# Patient Record
Sex: FEMALE | Race: WHITE | Employment: OTHER | URBAN - METROPOLITAN AREA
[De-identification: names, ages, dates, MRNs, and addresses within clinical notes are randomized per-mention and may not be internally consistent; named-entity substitution may affect disease eponyms.]

---

## 2017-04-26 ENCOUNTER — OFFICE VISIT (OUTPATIENT)
Dept: DERMATOLOGY CLINIC | Facility: CLINIC | Age: 77
End: 2017-04-26

## 2017-04-26 DIAGNOSIS — L82.0 INFLAMED SEBORRHEIC KERATOSIS: ICD-10-CM

## 2017-04-26 DIAGNOSIS — L82.1 SEBORRHEIC KERATOSES: ICD-10-CM

## 2017-04-26 DIAGNOSIS — D23.30 BENIGN NEOPLASM OF SKIN OF FACE: ICD-10-CM

## 2017-04-26 DIAGNOSIS — D22.9 MULTIPLE NEVI: ICD-10-CM

## 2017-04-26 DIAGNOSIS — D23.60 BENIGN NEOPLASM OF SKIN OF UPPER LIMB, INCLUDING SHOULDER, UNSPECIFIED LATERALITY: ICD-10-CM

## 2017-04-26 DIAGNOSIS — D23.5 BENIGN NEOPLASM OF SKIN OF TRUNK, EXCEPT SCROTUM: ICD-10-CM

## 2017-04-26 DIAGNOSIS — D23.4 BENIGN NEOPLASM OF SCALP AND SKIN OF NECK: ICD-10-CM

## 2017-04-26 DIAGNOSIS — L57.0 AK (ACTINIC KERATOSIS): Primary | ICD-10-CM

## 2017-04-26 PROCEDURE — 99213 OFFICE O/P EST LOW 20 MIN: CPT | Performed by: DERMATOLOGY

## 2017-04-26 PROCEDURE — 17000 DESTRUCT PREMALG LESION: CPT | Performed by: DERMATOLOGY

## 2017-05-15 NOTE — PROGRESS NOTES
Deann Kincaid is a 78year old female. HPI:     CC:  Patient presents with:  Skin Cancer: Pt here for full body check. Pt thinks she has lesion at back that [address] is watching (non-bothersome). Pls check growth at R scalp, scaly area at L temple.   Hx BCC NextGen:  \"two miscarriages. \"   • Corneal arcus senilis 2009     Corneal Arcus, OU   • Blepharitis of both eyes 2009   • Vitreous floaters of left eye 7/11/2014         Past Surgical History    HYSTERECTOMY         Social History   Marital Status: Natan Reyes neck, face,nails, hair, external eyes, including conjunctival mucosa, eyelids, lips external ears, back, chest,/ breasts, axillae,  abdomen, arms, legs, palms.      Multiple light to medium brown, well marginated, uniformly pigmented, macules and papules 6 See  Medications in grid. Instructions reviewed at length. Benign nevi, seborrheic  keratoses, cherry angiomas:  Reassurance regarding other benign skin lesions. Signs and symptoms of skin cancer, ABCDE's of melanoma discussed with patient.  Sunscreen us

## 2017-05-17 ENCOUNTER — HOSPITAL ENCOUNTER (OUTPATIENT)
Dept: MAMMOGRAPHY | Facility: HOSPITAL | Age: 77
Discharge: HOME | End: 2017-05-17
Attending: OBSTETRICS & GYNECOLOGY
Payer: COMMERCIAL

## 2017-05-17 DIAGNOSIS — Z12.31 VISIT FOR SCREENING MAMMOGRAM: ICD-10-CM

## 2017-05-17 PROCEDURE — 77067 SCR MAMMO BI INCL CAD: CPT | Performed by: OBSTETRICS & GYNECOLOGY

## 2017-08-28 ENCOUNTER — OFFICE VISIT (OUTPATIENT)
Dept: OPHTHALMOLOGY | Facility: CLINIC | Age: 77
End: 2017-08-28

## 2017-08-28 DIAGNOSIS — H52.203 HYPEROPIA OF BOTH EYES WITH ASTIGMATISM AND PRESBYOPIA: ICD-10-CM

## 2017-08-28 DIAGNOSIS — H52.4 HYPEROPIA OF BOTH EYES WITH ASTIGMATISM AND PRESBYOPIA: ICD-10-CM

## 2017-08-28 DIAGNOSIS — H52.03 HYPEROPIA OF BOTH EYES WITH ASTIGMATISM AND PRESBYOPIA: ICD-10-CM

## 2017-08-28 DIAGNOSIS — H25.12 NUCLEAR SCLEROTIC CATARACT OF LEFT EYE: Primary | ICD-10-CM

## 2017-08-28 PROCEDURE — 92015 DETERMINE REFRACTIVE STATE: CPT | Performed by: OPHTHALMOLOGY

## 2017-08-28 PROCEDURE — 92014 COMPRE OPH EXAM EST PT 1/>: CPT | Performed by: OPHTHALMOLOGY

## 2017-08-28 NOTE — PROGRESS NOTES
Mike Sylvester is a 78year old female. HPI:     HPI     EP/ 78 yr old here for a complete exam. LDE 11/20/15 with hx of hyperopia, astigmatism and floaters.  Pt complains of blurred vision OU at distance and near with her glasses and would like an new Pupils       Pupils    Right PERRL    Left PERRL          Visual Fields       Left Right     Full Full          Extraocular Movement       Right Left     Full, Ortho Full, Ortho          Dilation     Both eyes:  1.0% Mydriacyl and 2.5% Jai Synephrine @ 11:

## 2017-08-28 NOTE — PATIENT INSTRUCTIONS
Nuclear sclerotic cataract of left eye  Mild; no treatment. New glasses Rx given. Suggest update. Hyperopia with astigmatism and presbyopia  New glasses Rx given.

## 2017-10-19 ENCOUNTER — OFFICE VISIT (OUTPATIENT)
Dept: OPTOMETRY | Facility: CLINIC | Age: 77
End: 2017-10-19

## 2017-10-19 DIAGNOSIS — H52.4 HYPEROPIA OF BOTH EYES WITH ASTIGMATISM AND PRESBYOPIA: Primary | ICD-10-CM

## 2017-10-19 DIAGNOSIS — H52.03 HYPEROPIA OF BOTH EYES WITH ASTIGMATISM AND PRESBYOPIA: Primary | ICD-10-CM

## 2017-10-19 DIAGNOSIS — H52.203 HYPEROPIA OF BOTH EYES WITH ASTIGMATISM AND PRESBYOPIA: Primary | ICD-10-CM

## 2017-10-19 NOTE — PATIENT INSTRUCTIONS
Hyperopia with astigmatism and presbyopia  Patient will take the RX back to [facility] and have remade. I advised patient that the new RX change is minimal and that she should get the same brand of progressive she had at [facility].     NO CHARGE AUGUSTINA

## 2017-10-19 NOTE — PROGRESS NOTES
Mary Bess is a 78year old female. HPI:     HPI     Patient had glasses made from [employer]'s rx from 8-28-17 and she is not happy with the vision in her progressives or  her readers. Readers do not seem to be strong enough.  If she turns her head off natalie Visual Acuity (Snellen - Linear)       Right Left    Dist cc 20/25 20/25    Near cc 6pt 6pt            Refraction     Wearing Rx       Sphere Cylinder Axis Add    Right +2.00 +0.75 180 +2.75    Left +2.50 +0.75 180 +2.75          Manifest Refraction     Alton Rosado

## 2017-10-19 NOTE — ASSESSMENT & PLAN NOTE
Patient will take the RX back to [facility] and have remade. I advised patient that the new RX change is minimal and that she should get the same brand of progressive she had at [facility].     NO CHARGE GLASSES RECHECK FOR CC

## 2017-11-29 ENCOUNTER — OFFICE VISIT (OUTPATIENT)
Dept: DERMATOLOGY CLINIC | Facility: CLINIC | Age: 77
End: 2017-11-29

## 2017-11-29 VITALS — BODY MASS INDEX: 20.29 KG/M2 | HEIGHT: 59.5 IN | WEIGHT: 102 LBS

## 2017-11-29 DIAGNOSIS — Z80.8 FAMILY HISTORY OF MELANOMA: ICD-10-CM

## 2017-11-29 DIAGNOSIS — D22.9 MULTIPLE NEVI: ICD-10-CM

## 2017-11-29 DIAGNOSIS — D23.60 BENIGN NEOPLASM OF SKIN OF UPPER LIMB, INCLUDING SHOULDER, UNSPECIFIED LATERALITY: ICD-10-CM

## 2017-11-29 DIAGNOSIS — L82.0 INFLAMED SEBORRHEIC KERATOSIS: ICD-10-CM

## 2017-11-29 DIAGNOSIS — D23.4 BENIGN NEOPLASM OF SCALP AND SKIN OF NECK: ICD-10-CM

## 2017-11-29 DIAGNOSIS — L57.0 AK (ACTINIC KERATOSIS): Primary | ICD-10-CM

## 2017-11-29 DIAGNOSIS — D23.30 BENIGN NEOPLASM OF SKIN OF FACE: ICD-10-CM

## 2017-11-29 DIAGNOSIS — D23.5 BENIGN NEOPLASM OF SKIN OF TRUNK, EXCEPT SCROTUM: ICD-10-CM

## 2017-11-29 PROCEDURE — 99213 OFFICE O/P EST LOW 20 MIN: CPT | Performed by: DERMATOLOGY

## 2017-11-29 PROCEDURE — G0463 HOSPITAL OUTPT CLINIC VISIT: HCPCS | Performed by: DERMATOLOGY

## 2017-12-10 NOTE — PROGRESS NOTES
Mike Sylvester is a 78year old female.   HPI:     CC:  Patient presents with:  Full Skin Exam: areas of concern on face ,head ,possible cyst on scalp ,red lesion on left thigh ,spot on back that is itchy ,personal HX of BCC and family HX of BCC and Melano Surgical History:  No date: HYSTERECTOMY      Comment: age 41    Social History  Social History   Marital status:   Spouse name: N/A    Years of education: N/A  Number of children: N/A     Occupational History  None on file     Social History Main T light to medium brown, well marginated, uniformly pigmented, macules and papules 6 mm and less scattered on exam. pigmented lesions examined with dermoscopy benign-appearing patterns.      Waxy tannish keratotic papules scattered, cherry-red vascular papule therapies, risks benefits discussed. Pathophysiology discussed with patient. Therapeutic options reviewed. See  Medications in grid. Instructions reviewed at length.     Benign nevi, seborrheic  keratoses, cherry angiomas:  Reassurance regarding other cheryl

## 2018-08-13 ENCOUNTER — HOSPITAL ENCOUNTER (OUTPATIENT)
Dept: MAMMOGRAPHY | Facility: HOSPITAL | Age: 78
Discharge: HOME | End: 2018-08-13
Attending: OBSTETRICS & GYNECOLOGY
Payer: COMMERCIAL

## 2018-08-13 ENCOUNTER — HOSPITAL ENCOUNTER (OUTPATIENT)
Dept: BONE DENSITY | Facility: HOSPITAL | Age: 78
Discharge: HOME | End: 2018-08-13
Attending: OBSTETRICS & GYNECOLOGY
Payer: COMMERCIAL

## 2018-08-13 DIAGNOSIS — Z78.0 POSTMENOPAUSAL STATUS: ICD-10-CM

## 2018-08-13 DIAGNOSIS — Z12.31 VISIT FOR SCREENING MAMMOGRAM: ICD-10-CM

## 2018-08-13 PROCEDURE — 77067 SCR MAMMO BI INCL CAD: CPT | Performed by: OBSTETRICS & GYNECOLOGY

## 2018-08-13 PROCEDURE — 77080 DXA BONE DENSITY AXIAL: CPT | Performed by: OBSTETRICS & GYNECOLOGY

## 2018-08-20 ENCOUNTER — OFFICE VISIT (OUTPATIENT)
Dept: DERMATOLOGY CLINIC | Facility: CLINIC | Age: 78
End: 2018-08-20
Payer: MEDICARE

## 2018-08-20 ENCOUNTER — TELEPHONE (OUTPATIENT)
Dept: DERMATOLOGY CLINIC | Facility: CLINIC | Age: 78
End: 2018-08-20

## 2018-08-20 DIAGNOSIS — D23.4 BENIGN NEOPLASM OF SCALP AND SKIN OF NECK: ICD-10-CM

## 2018-08-20 DIAGNOSIS — D23.60 BENIGN NEOPLASM OF SKIN OF UPPER LIMB, INCLUDING SHOULDER, UNSPECIFIED LATERALITY: ICD-10-CM

## 2018-08-20 DIAGNOSIS — D22.9 MULTIPLE NEVI: ICD-10-CM

## 2018-08-20 DIAGNOSIS — L82.0 INFLAMED SEBORRHEIC KERATOSIS: ICD-10-CM

## 2018-08-20 DIAGNOSIS — L82.1 SEBORRHEIC KERATOSES: ICD-10-CM

## 2018-08-20 DIAGNOSIS — Z80.8 FAMILY HISTORY OF MELANOMA: ICD-10-CM

## 2018-08-20 DIAGNOSIS — L25.5 RHUS DERMATITIS: Primary | ICD-10-CM

## 2018-08-20 DIAGNOSIS — D23.30 BENIGN NEOPLASM OF SKIN OF FACE: ICD-10-CM

## 2018-08-20 DIAGNOSIS — L57.0 AK (ACTINIC KERATOSIS): ICD-10-CM

## 2018-08-20 DIAGNOSIS — D23.5 BENIGN NEOPLASM OF SKIN OF TRUNK, EXCEPT SCROTUM: ICD-10-CM

## 2018-08-20 PROCEDURE — 99214 OFFICE O/P EST MOD 30 MIN: CPT | Performed by: DERMATOLOGY

## 2018-08-20 PROCEDURE — G0463 HOSPITAL OUTPT CLINIC VISIT: HCPCS | Performed by: DERMATOLOGY

## 2018-08-20 RX ORDER — PREDNISONE 10 MG/1
TABLET ORAL
Qty: 30 TABLET | Refills: 0 | Status: SHIPPED | OUTPATIENT
Start: 2018-08-20 | End: 2018-09-01

## 2018-09-03 NOTE — PROGRESS NOTES
Franklin More is a 70year old female. HPI:     CC:  Patient presents with:  Derm Problem: LOV 11/29/2017. Possible poison ivy rash to bialteral arms, neck, trunk, and buttocks x 1 week. Applying calamine lotion. No help. Added on to schedule today.   Ot 2009 and 7/11/2014    OU   • Miscarriage     Per NextGen:  \"two miscarriages. \"   • Subjective visual disturbance of both eyes 7/11/2014   • Vitreous floaters of left eye 7/11/2014     Past Surgical History:  No date: HYSTERECTOMY      Comment: age 41 complaints. No new or changeing lesions other than noted above. No fevers, chills, night sweats, unusual sun sensitivity. No other skin complaints. A12 point review of systems was completed. Pertinent positives and negatives noted in the the HPI. keratoses    See details on map. Extensive erythematous papular vesicular linear patches over the trunk arms erythematous patches over the arms neck buttocks thighs. Resolving bite-like lesion at left lateral thigh. Dermatitis.   Due to poison ivy/ r melanoma discussed with patient. Sunscreen use, sun protection, self exams reviewed. Followup as noted RTC routine checkup 6 mos - one year or p.r.n.     RTC 6 months or as needed

## 2019-01-16 ENCOUNTER — OFFICE VISIT (OUTPATIENT)
Dept: DERMATOLOGY CLINIC | Facility: CLINIC | Age: 79
End: 2019-01-16
Payer: COMMERCIAL

## 2019-01-16 VITALS — BODY MASS INDEX: 20.16 KG/M2 | WEIGHT: 100 LBS | HEIGHT: 59 IN

## 2019-01-16 DIAGNOSIS — D22.9 MULTIPLE NEVI: ICD-10-CM

## 2019-01-16 DIAGNOSIS — L82.1 SEBORRHEIC KERATOSES: ICD-10-CM

## 2019-01-16 DIAGNOSIS — D23.5 BENIGN NEOPLASM OF SKIN OF TRUNK, EXCEPT SCROTUM: ICD-10-CM

## 2019-01-16 DIAGNOSIS — Z80.8 FAMILY HISTORY OF MELANOMA: ICD-10-CM

## 2019-01-16 DIAGNOSIS — D23.60 BENIGN NEOPLASM OF SKIN OF UPPER LIMB, INCLUDING SHOULDER, UNSPECIFIED LATERALITY: ICD-10-CM

## 2019-01-16 DIAGNOSIS — D23.4 BENIGN NEOPLASM OF SCALP AND SKIN OF NECK: ICD-10-CM

## 2019-01-16 DIAGNOSIS — D23.30 BENIGN NEOPLASM OF SKIN OF FACE: ICD-10-CM

## 2019-01-16 DIAGNOSIS — L57.0 AK (ACTINIC KERATOSIS): ICD-10-CM

## 2019-01-16 DIAGNOSIS — D48.5 NEOPLASM OF UNCERTAIN BEHAVIOR OF SKIN: Primary | ICD-10-CM

## 2019-01-16 DIAGNOSIS — L82.0 INFLAMED SEBORRHEIC KERATOSIS: ICD-10-CM

## 2019-01-16 PROCEDURE — 99213 OFFICE O/P EST LOW 20 MIN: CPT | Performed by: DERMATOLOGY

## 2019-01-16 PROCEDURE — 88305 TISSUE EXAM BY PATHOLOGIST: CPT | Performed by: DERMATOLOGY

## 2019-01-16 PROCEDURE — 11102 TANGNTL BX SKIN SINGLE LES: CPT | Performed by: DERMATOLOGY

## 2019-01-16 PROCEDURE — 11103 TANGNTL BX SKIN EA SEP/ADDL: CPT | Performed by: DERMATOLOGY

## 2019-01-16 RX ORDER — ASCORBIC ACID 1000 MG
1 TABLET ORAL
COMMUNITY

## 2019-01-23 RX ORDER — IMIQUIMOD 12.5 MG/.25G
CREAM TOPICAL
Qty: 12 EACH | Refills: 0 | Status: SHIPPED | OUTPATIENT
Start: 2019-01-23 | End: 2020-08-05 | Stop reason: ALTCHOICE

## 2019-01-27 NOTE — PROGRESS NOTES
Operative Report                     Shave/  Tangential biopsy     Clinical diagnosis:    Size of lesion:    Location:Diagnosis/Clinical History: pt with irregular lesions on exam  Spec 1 Description >>>>>: left thigh  Spec 1 Comment: red crusted lamonte

## 2019-01-27 NOTE — PROGRESS NOTES
Ry Martinez is a 70year old female. HPI:     CC:  Patient presents with:  Derm Problem: full body skin check, recheck spot on left leg. No new lesions noted. Allergies:  Patient has no known allergies.     HISTORY:    Past Medical History:   D topically 2 (two) times daily.  Apply bid Disp: 597 g Rfl: 3     Allergies:   No Known Allergies    Past Medical History:   Diagnosis Date   • Basal cell carcinoma 01/16/2019    left thigh   • Blepharitis of both eyes 2009   • Corneal arcus senilis 2009 Asked        Caffeine Concern: Yes          Tea, 1-3 cups daily        Occupational Exposure: Not Asked        Hobby Hazards: Not Asked        Sleep Concern: Not Asked        Stress Concern: Not Asked        Weight Concern: Not Asked        Special Diet: N benign-appearing patterns. Waxy tannish keratotic papules scattered, cherry-red vascular papules scattered. See map today's date for lesions noted . Otherwise remarkable for lesions as noted on map.   See details of examination  See Assessment / Await path  patient with history of skin cancer. No evidence of recurrence. No new skin cancer. No recurrence BCC    Family history melanoma in son continue regular follow-up.   Numerous benign seborrheic keratoses    Multiple benign keratoses reassura

## 2020-01-30 ENCOUNTER — HOSPITAL ENCOUNTER (OUTPATIENT)
Dept: MAMMOGRAPHY | Facility: HOSPITAL | Age: 80
Discharge: HOME | End: 2020-01-30
Attending: INTERNAL MEDICINE
Payer: MEDICARE

## 2020-01-30 DIAGNOSIS — Z12.31 ENCOUNTER FOR SCREENING MAMMOGRAM FOR MALIGNANT NEOPLASM OF BREAST: ICD-10-CM

## 2020-01-30 PROCEDURE — 77063 BREAST TOMOSYNTHESIS BI: CPT | Performed by: INTERNAL MEDICINE

## 2020-01-30 PROCEDURE — 77067 SCR MAMMO BI INCL CAD: CPT | Performed by: INTERNAL MEDICINE

## 2020-07-01 ENCOUNTER — OFFICE VISIT (OUTPATIENT)
Dept: DERMATOLOGY CLINIC | Facility: CLINIC | Age: 80
End: 2020-07-01
Payer: COMMERCIAL
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
## 2020-07-01 DIAGNOSIS — S30.861A TICK BITE OF ABDOMEN, INITIAL ENCOUNTER: Primary | ICD-10-CM

## 2020-07-01 PROCEDURE — G0463 HOSPITAL OUTPT CLINIC VISIT: HCPCS | Performed by: DERMATOLOGY

## 2020-07-01 PROCEDURE — 99213 OFFICE O/P EST LOW 20 MIN: CPT | Performed by: DERMATOLOGY

## 2020-07-01 RX ORDER — FLUTICASONE PROPIONATE 50 MCG
2 SPRAY, SUSPENSION (ML) NASAL DAILY
COMMUNITY
Start: 2020-04-10

## 2020-07-01 RX ORDER — DOXYCYCLINE 75 MG/1
75 TABLET ORAL 2 TIMES DAILY
Qty: 42 TABLET | Refills: 1 | Status: SHIPPED | OUTPATIENT
Start: 2020-07-01 | End: 2020-08-05 | Stop reason: ALTCHOICE

## 2020-07-01 RX ORDER — MULTIVIT WITH MINERALS/LUTEIN
1000 TABLET ORAL DAILY
COMMUNITY

## 2020-07-05 NOTE — PROGRESS NOTES
Ian Hitchcock is a 68year old female. Patient presents with:  Derm Problem: LOV 1/16/19. pt presenting today with rash to L side of abdomen for 3 days. pt states she was biitten by a tick 6 days ago. c/o tender to the touch.  Not currently using any Use      Smoking status: Former Smoker        Quit date: 1985        Years since quittin.5      Smokeless tobacco: Never Used    Alcohol use:  Yes      Alcohol/week: 7.0 standard drinks      Types: 7 Glasses of wine per week      Comment: 7 drinks Laterality Date   • HYSTERECTOMY      age 41     Social History    Socioeconomic History      Marital status:       Spouse name: Not on file      Number of children: Not on file      Years of education: Not on file      Highest education level: Not cups daily        Occupational Exposure: Not Asked        Hobby Hazards: Not Asked        Sleep Concern: Not Asked        Stress Concern: Not Asked        Weight Concern: Not Asked        Special Diet: Not Asked        Back Care: Not Asked        Exercise: chills, night sweats, photosensitivity, lymph node swelling. No other skin complaints. Physical examination:  Well-developed well-nourished patient alert oriented in no acute distress.       Exam performed, including scalp, head, neck, face,nails, genie Doxycycline Monohydrate 75 MG Oral Tab 42 tablet 1     Sig: Take 1 tablet (75 mg total) by mouth 2 (two) times daily.        Tick bite of abdomen, initial encounter  (primary encounter diagnosis)    No orders of the defined types were placed in this encount

## 2020-08-05 ENCOUNTER — OFFICE VISIT (OUTPATIENT)
Dept: DERMATOLOGY CLINIC | Facility: CLINIC | Age: 80
End: 2020-08-05
Payer: COMMERCIAL

## 2020-08-05 DIAGNOSIS — Z80.8 FAMILY HISTORY OF MELANOMA: ICD-10-CM

## 2020-08-05 DIAGNOSIS — L57.0 AK (ACTINIC KERATOSIS): Primary | ICD-10-CM

## 2020-08-05 DIAGNOSIS — D22.9 MULTIPLE NEVI: ICD-10-CM

## 2020-08-05 DIAGNOSIS — D23.4 BENIGN NEOPLASM OF SCALP AND SKIN OF NECK: ICD-10-CM

## 2020-08-05 DIAGNOSIS — D23.5 BENIGN NEOPLASM OF SKIN OF TRUNK, EXCEPT SCROTUM: ICD-10-CM

## 2020-08-05 DIAGNOSIS — L82.1 SEBORRHEIC KERATOSES: ICD-10-CM

## 2020-08-05 DIAGNOSIS — D23.30 BENIGN NEOPLASM OF SKIN OF FACE: ICD-10-CM

## 2020-08-05 DIAGNOSIS — D23.60 BENIGN NEOPLASM OF SKIN OF UPPER LIMB, INCLUDING SHOULDER, UNSPECIFIED LATERALITY: ICD-10-CM

## 2020-08-05 DIAGNOSIS — L82.0 INFLAMED SEBORRHEIC KERATOSIS: ICD-10-CM

## 2020-08-05 PROCEDURE — 99213 OFFICE O/P EST LOW 20 MIN: CPT | Performed by: DERMATOLOGY

## 2020-08-05 PROCEDURE — G0463 HOSPITAL OUTPT CLINIC VISIT: HCPCS | Performed by: DERMATOLOGY

## 2020-08-10 NOTE — PROGRESS NOTES
Andrew Thomas is a 68year old female. HPI:     CC:  Patient presents with:  Full Skin Exam: LOV 7/1/2020.  Pt presenting for full body skin exam. Pt has a personal hx of BCC to L mid brow (2000) and L thigh (2019) and intradermal nevus to R distal thigh Oral Tab Take 1 tablet by mouth. • alprazolam (XANAX) 0.5 MG Oral Tab   0   • Multiple Vitamins-Minerals (MULTI-VITAMIN/MINERALS) Oral Tab Take 1 tablet by mouth daily. • Fluticasone Propionate 50 MCG/ACT Nasal Suspension 2 sprays daily.        Jenifer Carroll file    Relationships      Social connections:        Talks on phone: Not on file        Gets together: Not on file        Attends [employer] service: Not on file        Active member of club or organization: Not on file        Attends meetings of clubs or resolved. Patient presents with concerns above. Past notes/ records and appropriate/relevant lab results including pathology and past body maps reviewed. Updated and new information noted in current visit.      Patient has been in their usual state of shoulder, unspecified laterality  Family history of melanoma  Seborrheic keratoses    See details on map. No active AK's no recurrence of previous skin cancers. Tick bite resolved.   No new suspicious lesions   Remarkable for: Waxy keratotic papules an

## 2020-11-12 ENCOUNTER — LAB ENCOUNTER (OUTPATIENT)
Dept: LAB | Facility: HOSPITAL | Age: 80
End: 2020-11-12
Attending: SPECIALIST
Payer: MEDICARE

## 2020-11-12 DIAGNOSIS — R53.83 FATIGUE: Primary | ICD-10-CM

## 2020-11-12 PROCEDURE — 84443 ASSAY THYROID STIM HORMONE: CPT

## 2020-11-12 PROCEDURE — 36415 COLL VENOUS BLD VENIPUNCTURE: CPT

## 2020-11-30 ENCOUNTER — LAB ENCOUNTER (OUTPATIENT)
Dept: LAB | Facility: HOSPITAL | Age: 80
End: 2020-11-30
Attending: SPECIALIST
Payer: MEDICARE

## 2020-11-30 DIAGNOSIS — R53.83 FATIGUE: Primary | ICD-10-CM

## 2020-11-30 PROCEDURE — 36415 COLL VENOUS BLD VENIPUNCTURE: CPT

## 2020-11-30 PROCEDURE — 84436 ASSAY OF TOTAL THYROXINE: CPT

## 2021-06-23 ENCOUNTER — HOSPITAL ENCOUNTER (OUTPATIENT)
Dept: MAMMOGRAPHY | Facility: HOSPITAL | Age: 81
Discharge: HOME | End: 2021-06-23
Attending: INTERNAL MEDICINE
Payer: MEDICARE

## 2021-06-23 DIAGNOSIS — Z12.31 ENCOUNTER FOR SCREENING MAMMOGRAM FOR MALIGNANT NEOPLASM OF BREAST: ICD-10-CM

## 2021-06-23 PROCEDURE — 77067 SCR MAMMO BI INCL CAD: CPT | Performed by: INTERNAL MEDICINE

## 2021-06-23 PROCEDURE — 77063 BREAST TOMOSYNTHESIS BI: CPT | Performed by: INTERNAL MEDICINE

## 2021-07-06 ENCOUNTER — HOSPITAL ENCOUNTER (OUTPATIENT)
Dept: ULTRASOUND IMAGING | Facility: HOSPITAL | Age: 81
Discharge: HOME | End: 2021-07-06
Attending: INTERNAL MEDICINE
Payer: MEDICARE

## 2021-07-06 DIAGNOSIS — R93.89 ABNORMAL IMAGING OF THYROID: ICD-10-CM

## 2021-07-06 PROCEDURE — 76536 US EXAM OF HEAD AND NECK: CPT | Performed by: INTERNAL MEDICINE

## 2021-12-09 ENCOUNTER — HOSPITAL ENCOUNTER (OUTPATIENT)
Dept: CT IMAGING | Age: 81
Discharge: HOME | End: 2021-12-09
Attending: INTERNAL MEDICINE

## 2021-12-09 DIAGNOSIS — Z13.6 SCREENING FOR CARDIOVASCULAR CONDITION: ICD-10-CM

## 2021-12-17 ENCOUNTER — OFFICE VISIT (OUTPATIENT)
Dept: DERMATOLOGY CLINIC | Facility: CLINIC | Age: 81
End: 2021-12-17
Payer: COMMERCIAL

## 2021-12-17 DIAGNOSIS — D23.5 BENIGN NEOPLASM OF SKIN OF TRUNK, EXCEPT SCROTUM: ICD-10-CM

## 2021-12-17 DIAGNOSIS — L82.0 INFLAMED SEBORRHEIC KERATOSIS: ICD-10-CM

## 2021-12-17 DIAGNOSIS — D23.4 BENIGN NEOPLASM OF SCALP AND SKIN OF NECK: ICD-10-CM

## 2021-12-17 DIAGNOSIS — L82.1 SEBORRHEIC KERATOSES: ICD-10-CM

## 2021-12-17 DIAGNOSIS — D23.60 BENIGN NEOPLASM OF SKIN OF UPPER LIMB, INCLUDING SHOULDER, UNSPECIFIED LATERALITY: ICD-10-CM

## 2021-12-17 DIAGNOSIS — L57.0 AK (ACTINIC KERATOSIS): Primary | ICD-10-CM

## 2021-12-17 DIAGNOSIS — Z80.8 FAMILY HISTORY OF MELANOMA: ICD-10-CM

## 2021-12-17 DIAGNOSIS — D23.30 BENIGN NEOPLASM OF SKIN OF FACE: ICD-10-CM

## 2021-12-17 DIAGNOSIS — D22.9 MULTIPLE NEVI: ICD-10-CM

## 2021-12-17 PROCEDURE — 99213 OFFICE O/P EST LOW 20 MIN: CPT | Performed by: DERMATOLOGY

## 2021-12-17 RX ORDER — MOXIFLOXACIN 5 MG/ML
SOLUTION/ DROPS OPHTHALMIC
COMMUNITY
Start: 2021-11-23

## 2021-12-17 RX ORDER — TETRACYCLINE HCL 500 MG
CAPSULE ORAL AS DIRECTED
COMMUNITY

## 2021-12-17 RX ORDER — PREDNISOLONE ACETATE 10 MG/ML
SUSPENSION/ DROPS OPHTHALMIC
COMMUNITY
Start: 2021-12-05

## 2022-01-02 NOTE — PROGRESS NOTES
Jennifer Huntley is a 80year old female. HPI:     CC:  Patient presents with:  Full Skin Exam: LOV 8/5/2020 - Pt presenting for full body skin exam.  Pt has personal hx of BCC and Intradermal Nevus. Allergies:  Patient has no known allergies.     HI Take 1,000 Units by mouth daily. • Coenzyme Q10 (CO Q 10) 10 MG Oral Cap Take 1 tablet by mouth. • Misc Natural Products (OSTEO BI-FLEX ADV DOUBLE ST) Oral Tab Take 1 tablet by mouth.      • alprazolam (XANAX) 0.5 MG Oral Tab   0   • Multiple Vitami Outdoor occupation: Not Asked        Pt has a pacemaker: No        Pt has a defibrillator: No        Breast feeding: Not Asked        Reaction to local anesthetic: No         Service: Not Asked        Blood Transfusions: Not Asked        Caffeine new or changeing lesions other than noted above. No fevers, chills, night sweats, unusual sun sensitivity. No other skin complaints. A12 point review of systems was completed. Pertinent positives and negatives noted in the the HPI.      History, medicat keratoses scattered over the vertex. Waxy tan keratotic papules lesions in areas of concern as noted reassurance given. Benign nature discussed. Possibility of cryo, alphahydroxy acids over-the-counter retinol's discussed.     History of actinic keratose

## 2022-03-19 ENCOUNTER — HOSPITAL ENCOUNTER (EMERGENCY)
Facility: HOSPITAL | Age: 82
Discharge: HOME | End: 2022-03-19
Attending: EMERGENCY MEDICINE
Payer: MEDICARE

## 2022-03-19 VITALS
TEMPERATURE: 98 F | SYSTOLIC BLOOD PRESSURE: 164 MMHG | WEIGHT: 100 LBS | RESPIRATION RATE: 18 BRPM | BODY MASS INDEX: 19.63 KG/M2 | OXYGEN SATURATION: 99 % | DIASTOLIC BLOOD PRESSURE: 74 MMHG | HEART RATE: 85 BPM | HEIGHT: 60 IN

## 2022-03-19 DIAGNOSIS — H53.9 TRANSIENT VISION DISTURBANCE: Primary | ICD-10-CM

## 2022-03-19 LAB — ERYTHROCYTE [SEDIMENTATION RATE] IN BLOOD: 9 MM/HR

## 2022-03-19 PROCEDURE — 85652 RBC SED RATE AUTOMATED: CPT | Performed by: EMERGENCY MEDICINE

## 2022-03-19 PROCEDURE — 99283 EMERGENCY DEPT VISIT LOW MDM: CPT

## 2022-03-19 RX ORDER — TETRACAINE HYDROCHLORIDE 5 MG/ML
1 SOLUTION OPHTHALMIC ONCE
Status: COMPLETED | OUTPATIENT
Start: 2022-03-19 | End: 2022-03-19

## 2022-03-19 NOTE — ED INITIAL ASSESSMENT (HPI)
Cataract eye surg in November with revision of surg 1 week ago. awoke today with blurred vision to left eye and pain.

## 2022-08-15 ENCOUNTER — OFFICE VISIT (OUTPATIENT)
Dept: DERMATOLOGY CLINIC | Facility: CLINIC | Age: 82
End: 2022-08-15
Payer: COMMERCIAL

## 2022-08-15 DIAGNOSIS — Z80.8 FAMILY HISTORY OF MELANOMA: ICD-10-CM

## 2022-08-15 DIAGNOSIS — L25.9 CONTACT DERMATITIS AND ECZEMA: ICD-10-CM

## 2022-08-15 DIAGNOSIS — L57.0 AK (ACTINIC KERATOSIS): Primary | ICD-10-CM

## 2022-08-15 DIAGNOSIS — D22.9 MULTIPLE NEVI: ICD-10-CM

## 2022-08-15 DIAGNOSIS — L82.0 INFLAMED SEBORRHEIC KERATOSIS: ICD-10-CM

## 2022-08-15 DIAGNOSIS — D23.9 BENIGN NEOPLASM OF SKIN, UNSPECIFIED LOCATION: ICD-10-CM

## 2022-08-15 PROCEDURE — 99213 OFFICE O/P EST LOW 20 MIN: CPT | Performed by: DERMATOLOGY

## 2022-08-15 RX ORDER — PREDNISONE 10 MG/1
TABLET ORAL
Qty: 18 TABLET | Refills: 0 | Status: SHIPPED | OUTPATIENT
Start: 2022-08-15

## 2022-09-01 ENCOUNTER — OFFICE VISIT (OUTPATIENT)
Dept: OTOLARYNGOLOGY | Facility: CLINIC | Age: 82
End: 2022-09-01
Payer: MEDICARE

## 2022-09-01 VITALS — WEIGHT: 100 LBS | HEIGHT: 60 IN | BODY MASS INDEX: 19.63 KG/M2

## 2022-09-01 DIAGNOSIS — H61.23 CERUMEN DEBRIS ON TYMPANIC MEMBRANE OF BOTH EARS: ICD-10-CM

## 2022-09-01 DIAGNOSIS — R68.2 DRY MOUTH: Primary | ICD-10-CM

## 2022-09-01 PROCEDURE — 99213 OFFICE O/P EST LOW 20 MIN: CPT | Performed by: SPECIALIST

## 2022-09-01 NOTE — PATIENT INSTRUCTIONS
To try Biotene mouthwash for dry mouth. Ears were fully cleaned of cerumen. Follow-up in 1 years time, sooner if problems.

## 2023-01-23 ENCOUNTER — HOSPITAL ENCOUNTER (OUTPATIENT)
Dept: MAMMOGRAPHY | Facility: HOSPITAL | Age: 83
Discharge: HOME | End: 2023-01-23
Attending: INTERNAL MEDICINE
Payer: MEDICARE

## 2023-01-23 ENCOUNTER — HOSPITAL ENCOUNTER (OUTPATIENT)
Dept: BONE DENSITY | Facility: HOSPITAL | Age: 83
Discharge: HOME | End: 2023-01-23
Attending: INTERNAL MEDICINE
Payer: MEDICARE

## 2023-01-23 DIAGNOSIS — Z12.31 ENCOUNTER FOR SCREENING MAMMOGRAM FOR MALIGNANT NEOPLASM OF BREAST: ICD-10-CM

## 2023-01-23 DIAGNOSIS — M81.0 AGE-RELATED OSTEOPOROSIS WITHOUT CURRENT PATHOLOGICAL FRACTURE: ICD-10-CM

## 2023-01-23 PROCEDURE — 77067 SCR MAMMO BI INCL CAD: CPT | Performed by: INTERNAL MEDICINE

## 2023-01-23 PROCEDURE — 77063 BREAST TOMOSYNTHESIS BI: CPT | Performed by: INTERNAL MEDICINE

## 2023-01-23 PROCEDURE — 77080 DXA BONE DENSITY AXIAL: CPT | Performed by: INTERNAL MEDICINE

## 2023-11-16 ENCOUNTER — LAB ENCOUNTER (OUTPATIENT)
Dept: LAB | Facility: HOSPITAL | Age: 83
End: 2023-11-16
Attending: SPECIALIST
Payer: MEDICARE

## 2023-11-16 ENCOUNTER — OFFICE VISIT (OUTPATIENT)
Dept: OTOLARYNGOLOGY | Facility: CLINIC | Age: 83
End: 2023-11-16

## 2023-11-16 VITALS — BODY MASS INDEX: 19.83 KG/M2 | WEIGHT: 101 LBS | HEIGHT: 60 IN

## 2023-11-16 DIAGNOSIS — E04.2 MULTINODULAR GOITER: Primary | ICD-10-CM

## 2023-11-16 DIAGNOSIS — R68.89 COLD INTOLERANCE: ICD-10-CM

## 2023-11-16 DIAGNOSIS — E04.2 MULTINODULAR GOITER: ICD-10-CM

## 2023-11-16 LAB — TSI SER-ACNC: 4.01 MIU/ML (ref 0.55–4.78)

## 2023-11-16 PROCEDURE — 36415 COLL VENOUS BLD VENIPUNCTURE: CPT

## 2023-11-16 PROCEDURE — 84443 ASSAY THYROID STIM HORMONE: CPT

## 2023-11-16 PROCEDURE — 99213 OFFICE O/P EST LOW 20 MIN: CPT | Performed by: SPECIALIST

## 2023-11-16 RX ORDER — PRAVASTATIN SODIUM 10 MG
10 TABLET ORAL NIGHTLY
COMMUNITY

## 2023-11-16 RX ORDER — AMLODIPINE BESYLATE 2.5 MG/1
1.25 TABLET ORAL DAILY
COMMUNITY
Start: 2023-09-13

## 2023-11-16 NOTE — PATIENT INSTRUCTIONS
A repeat thyroid ultrasound was ordered for your multinodular goiter. I also ordered a TSH as you have cold intolerance. I will of course notify you of all results.

## 2023-12-18 ENCOUNTER — HOSPITAL ENCOUNTER (OUTPATIENT)
Dept: ULTRASOUND IMAGING | Facility: HOSPITAL | Age: 83
Discharge: HOME | End: 2023-12-18
Attending: SPECIALIST
Payer: MEDICARE

## 2023-12-18 DIAGNOSIS — E04.2 MULTINODULAR GOITER: ICD-10-CM

## 2023-12-18 PROCEDURE — 76536 US EXAM OF HEAD AND NECK: CPT | Performed by: SPECIALIST

## 2024-03-13 ENCOUNTER — HOSPITAL ENCOUNTER (OUTPATIENT)
Dept: MAMMOGRAPHY | Facility: HOSPITAL | Age: 84
Discharge: HOME | End: 2024-03-13
Attending: INTERNAL MEDICINE
Payer: MEDICARE

## 2024-03-13 DIAGNOSIS — Z12.31 ENCOUNTER FOR SCREENING MAMMOGRAM FOR MALIGNANT NEOPLASM OF BREAST: ICD-10-CM

## 2024-03-13 PROCEDURE — 77063 BREAST TOMOSYNTHESIS BI: CPT | Performed by: INTERNAL MEDICINE

## 2024-03-13 PROCEDURE — 77067 SCR MAMMO BI INCL CAD: CPT | Performed by: INTERNAL MEDICINE

## 2024-05-20 ENCOUNTER — HOSPITAL ENCOUNTER (EMERGENCY)
Facility: HOSPITAL | Age: 84
Discharge: HOME | End: 2024-05-20
Attending: EMERGENCY MEDICINE

## 2024-05-20 ENCOUNTER — APPOINTMENT (OUTPATIENT)
Dept: CT IMAGING | Facility: HOSPITAL | Age: 84
End: 2024-05-20
Attending: EMERGENCY MEDICINE

## 2024-05-20 VITALS
BODY MASS INDEX: 19.96 KG/M2 | HEART RATE: 69 BPM | SYSTOLIC BLOOD PRESSURE: 121 MMHG | TEMPERATURE: 99 F | HEIGHT: 59 IN | WEIGHT: 99 LBS | DIASTOLIC BLOOD PRESSURE: 55 MMHG | OXYGEN SATURATION: 97 % | RESPIRATION RATE: 14 BRPM

## 2024-05-20 DIAGNOSIS — R42 DIZZINESS: Primary | ICD-10-CM

## 2024-05-20 LAB
ANION GAP SERPL CALC-SCNC: 4 MMOL/L (ref 0–18)
ATRIAL RATE: 54 BPM
BASOPHILS # BLD AUTO: 0.05 X10(3) UL (ref 0–0.2)
BASOPHILS NFR BLD AUTO: 1.1 %
BUN BLD-MCNC: 19 MG/DL (ref 9–23)
BUN/CREAT SERPL: 22.9 (ref 10–20)
CALCIUM BLD-MCNC: 8.9 MG/DL (ref 8.7–10.4)
CHLORIDE SERPL-SCNC: 107 MMOL/L (ref 98–112)
CO2 SERPL-SCNC: 27 MMOL/L (ref 21–32)
CREAT BLD-MCNC: 0.83 MG/DL
DEPRECATED RDW RBC AUTO: 44.3 FL (ref 35.1–46.3)
EGFRCR SERPLBLD CKD-EPI 2021: 70 ML/MIN/1.73M2 (ref 60–?)
EOSINOPHIL # BLD AUTO: 0.07 X10(3) UL (ref 0–0.7)
EOSINOPHIL NFR BLD AUTO: 1.5 %
ERYTHROCYTE [DISTWIDTH] IN BLOOD BY AUTOMATED COUNT: 13.1 % (ref 11–15)
GLUCOSE BLD-MCNC: 114 MG/DL (ref 70–99)
HCT VFR BLD AUTO: 35.9 %
HGB BLD-MCNC: 11.5 G/DL
IMM GRANULOCYTES # BLD AUTO: 0.01 X10(3) UL (ref 0–1)
IMM GRANULOCYTES NFR BLD: 0.2 %
LYMPHOCYTES # BLD AUTO: 1.54 X10(3) UL (ref 1–4)
LYMPHOCYTES NFR BLD AUTO: 32.5 %
MCH RBC QN AUTO: 29.6 PG (ref 26–34)
MCHC RBC AUTO-ENTMCNC: 32 G/DL (ref 31–37)
MCV RBC AUTO: 92.5 FL
MONOCYTES # BLD AUTO: 0.36 X10(3) UL (ref 0.1–1)
MONOCYTES NFR BLD AUTO: 7.6 %
NEUTROPHILS # BLD AUTO: 2.71 X10 (3) UL (ref 1.5–7.7)
NEUTROPHILS # BLD AUTO: 2.71 X10(3) UL (ref 1.5–7.7)
NEUTROPHILS NFR BLD AUTO: 57.1 %
OSMOLALITY SERPL CALC.SUM OF ELEC: 289 MOSM/KG (ref 275–295)
P AXIS: 60 DEGREES
P-R INTERVAL: 156 MS
PLATELET # BLD AUTO: 229 10(3)UL (ref 150–450)
POTASSIUM SERPL-SCNC: 4 MMOL/L (ref 3.5–5.1)
Q-T INTERVAL: 442 MS
QRS DURATION: 72 MS
QTC CALCULATION (BEZET): 419 MS
R AXIS: -1 DEGREES
RBC # BLD AUTO: 3.88 X10(6)UL
SODIUM SERPL-SCNC: 138 MMOL/L (ref 136–145)
T AXIS: 53 DEGREES
TROPONIN I SERPL HS-MCNC: <3 NG/L
TROPONIN I SERPL HS-MCNC: <3 NG/L
VENTRICULAR RATE: 54 BPM
WBC # BLD AUTO: 4.7 X10(3) UL (ref 4–11)

## 2024-05-20 PROCEDURE — 80048 BASIC METABOLIC PNL TOTAL CA: CPT | Performed by: EMERGENCY MEDICINE

## 2024-05-20 PROCEDURE — 96360 HYDRATION IV INFUSION INIT: CPT

## 2024-05-20 PROCEDURE — 93010 ELECTROCARDIOGRAM REPORT: CPT

## 2024-05-20 PROCEDURE — 93005 ELECTROCARDIOGRAM TRACING: CPT

## 2024-05-20 PROCEDURE — 84484 ASSAY OF TROPONIN QUANT: CPT | Performed by: EMERGENCY MEDICINE

## 2024-05-20 PROCEDURE — 99285 EMERGENCY DEPT VISIT HI MDM: CPT

## 2024-05-20 PROCEDURE — 85025 COMPLETE CBC W/AUTO DIFF WBC: CPT | Performed by: EMERGENCY MEDICINE

## 2024-05-20 PROCEDURE — 70450 CT HEAD/BRAIN W/O DYE: CPT | Performed by: EMERGENCY MEDICINE

## 2024-05-20 PROCEDURE — 96361 HYDRATE IV INFUSION ADD-ON: CPT

## 2024-05-20 NOTE — ED PROVIDER NOTES
Patient Seen in: [facility]    History     Chief Complaint   Patient presents with    Dizziness       HPI    History is provided by patient/independent historian: patient, patient's   84 year old female with no significant past medical history here with complaints of dizziness acutely upon waking up this morning when she was getting ready to make breakfast.  Patient felt this warm feeling going from her chest up into her head.  No headache, no unilateral numbness or tingling or weakness of the extremities.  No facial asymmetry or dysarthria.  She was concerned for having a heart attack.  No chest pain or shortness of breath.   reports she needed to lay down although that did not improve her symptoms.    History reviewed.   Past Medical History:    Basal cell carcinoma    left thigh    Basal cell carcinoma    Left eye    Blepharitis of both eyes    Corneal arcus senilis    Corneal Arcus, OU    Hypermetropia    OU    Hyperopia with astigmatism and presbyopia    OU    Intradermal nevus    right knee    Lentiginous junctional nevus of right thigh    right distal thigh    Miscarriage (HCC)    Per NextGen:  \"two miscarriages.\"    Subjective visual disturbance of both eyes    Vitreous floaters of left eye         History reviewed.   Past Surgical History:   Procedure Laterality Date    Cataract Bilateral 2021    Surgery    Hysterectomy      age 38         Home Medications reviewed :  (Not in a hospital admission)        History reviewed.   Social History     Socioeconomic History    Marital status:    Tobacco Use    Smoking status: Former     Current packs/day: 0.00     Types: Cigarettes     Quit date: 1985     Years since quittin.4    Smokeless tobacco: Never   Substance and Sexual Activity    Alcohol use: Yes     Alcohol/week: 7.0 standard drinks of alcohol     Types: 7 Glasses of wine per week     Comment: 7 drinks weekly    Drug use: No   Other Topics  Concern    Pt has a pacemaker No    Pt has a defibrillator No    Reaction to local anesthetic No    Caffeine Concern Yes     Comment: Tea, 1-3 cups daily         ROS  Review of Systems   Respiratory:  Negative for shortness of breath.    Cardiovascular:  Negative for chest pain.   Neurological:  Positive for dizziness.   All other systems reviewed and are negative.     All other pertinent organ systems are reviewed and are negative.      Physical Exam     ED Triage Vitals [05/20/24 0940]   /53   Pulse 52   Resp 20   Temp 98.5 °F (36.9 °C)   Temp src Temporal   SpO2 100 %   O2 Device None (Room air)     Vital signs reviewed.      Physical Exam  Vitals and nursing note reviewed.   Cardiovascular:      Pulses: Normal pulses.   Pulmonary:      Effort: No respiratory distress.   Abdominal:      General: There is no distension.   Neurological:      Mental Status: She is alert.      Comments: 5/5 strength in b/l UEs and LEs, normal sensation in all extremities, normal finger to nose b/l, normal heel to shin b/l, no pronator drift,  no facial asymmetry, normal speech           ED Course       Labs:     Labs Reviewed   BASIC METABOLIC PANEL (8) - Abnormal; Notable for the following components:       Result Value    Glucose 114 (*)     BUN/CREA Ratio 22.9 (*)     All other components within normal limits   CBC W/ DIFFERENTIAL - Abnormal; Notable for the following components:    HGB 11.5 (*)     All other components within normal limits   TROPONIN I HIGH SENSITIVITY - Normal   TROPONIN I HIGH SENSITIVITY - Normal   CBC WITH DIFFERENTIAL WITH PLATELET    Narrative:     The following orders were created for panel order CBC With Differential With Platelet.                  Procedure                               Abnormality         Status                                     ---------                               -----------         ------                                     CBC W/ DIFFERENTIAL[[id number]]          Abnormal             Final result                                                 Please view results for these tests on the individual orders.         My EKG Interpretation: EKG    Rate, intervals and axes as noted on EKG Report.  Rate: 54  Rhythm: Sinus Rhythm  Reading: abnormal for rate, normal for rhythm, no acute ST changes           As reviewed and Interpreted by me      Imaging Results Available and Reviewed while in ED:   CT BRAIN OR HEAD ([id number])    Result Date: 5/20/2024  CONCLUSION:  1.  No acute intracranial process. 2.  There are mild microvascular white matter ischemic changes, likely related to long-standing hypertension and/or diabetes. 3.  Atherosclerosis in the anterior circulation.  Dictated by (CST): Saeid Zepeda MD on 5/20/2024 at 10:11 AM     Finalized by (CST): Saeid Zepeda MD on 5/20/2024 at 10:14 AM         My review and independent interpretation of CT images: no ICH. Radiology report corroborates this in addition to other details as reported by them.      Decision rules/scores evaluated: none      Diagnostic labs/tests considered but not ordered: none    ED Medications Administered:   Medications   sodium chloride 0.9 % IV bolus 1,000 mL (1,000 mL Intravenous New Bag 5/20/24 1022)                [facility]       Medical Decision Making      Differential Diagnosis: After obtaining the patient's history, performing the physical exam and reviewing the diagnostics, multiple initial diagnoses were considered based on the presenting problem including ACS, CVA, ICH, FRANCINE, anxiety attack    External document review: I personally reviewed available external medical records for any recent pertinent discharge summaries, testing, and procedures - the findings are as follows: 1/10/24 visit with Dr. Figueroa for medicare annual wellness visit    Complicating Factors: The patient already  has a past medical history of Basal cell carcinoma (01/16/2019), Basal cell carcinoma (2000), Blepharitis of both eyes (2009), Corneal  arcus senilis (2009), Hypermetropia, Hyperopia with astigmatism and presbyopia (2009 and 7/11/2014), Intradermal nevus (01/16/2019), Lentiginous junctional nevus of right thigh (01/16/2019), Miscarriage (HCC), Subjective visual disturbance of both eyes (7/11/2014), and Vitreous floaters of left eye (7/11/2014). to contribute to the complexity of this ED evaluation.    Procedures performed: none    Discussed management with physician/appropriate source: none    Considered admission/deescalation of care for: none    Social determinants of health affecting patient care: none    Prescription medications considered: discussed continuing current medication regimen    The patient requires continuous monitoring for: dizziness    Shared decision making: discussed possible admission        Disposition and Plan     Clinical Impression:  1. Dizziness        Disposition:  Discharge    Follow-up:  Jody Figueroa  [address] [place] [id number]  [phone]    Follow up        Medications Prescribed:  Current Discharge Medication List

## 2024-05-20 NOTE — ED INITIAL ASSESSMENT (HPI)
Patient arrive ambulatory via triage with c/o feeling \"strange\". Patient stated at 0830 she felt a \"whoosh\" come over her body causing pressure to her head and tingling throughout her body. Patient denies headache, N/V.

## 2024-07-01 ENCOUNTER — OFFICE VISIT (OUTPATIENT)
Dept: INTERNAL MEDICINE CLINIC | Facility: CLINIC | Age: 84
End: 2024-07-01

## 2024-07-01 VITALS
SYSTOLIC BLOOD PRESSURE: 118 MMHG | DIASTOLIC BLOOD PRESSURE: 78 MMHG | HEART RATE: 68 BPM | BODY MASS INDEX: 19 KG/M2 | WEIGHT: 96.13 LBS | OXYGEN SATURATION: 97 %

## 2024-07-01 DIAGNOSIS — I73.9 PVD (PERIPHERAL VASCULAR DISEASE) (HCC): ICD-10-CM

## 2024-07-01 DIAGNOSIS — I73.00 RAYNAUD'S DISEASE WITHOUT GANGRENE: ICD-10-CM

## 2024-07-01 DIAGNOSIS — R10.9 ABDOMINAL PAIN, UNSPECIFIED ABDOMINAL LOCATION: ICD-10-CM

## 2024-07-01 DIAGNOSIS — E04.1 THYROID NODULE: ICD-10-CM

## 2024-07-01 DIAGNOSIS — E78.5 HYPERLIPIDEMIA, UNSPECIFIED HYPERLIPIDEMIA TYPE: ICD-10-CM

## 2024-07-01 DIAGNOSIS — Z76.89 ENCOUNTER TO ESTABLISH CARE WITH NEW DOCTOR: Primary | ICD-10-CM

## 2024-07-01 DIAGNOSIS — M81.0 AGE-RELATED OSTEOPOROSIS WITHOUT CURRENT PATHOLOGICAL FRACTURE: ICD-10-CM

## 2024-07-01 DIAGNOSIS — R19.4 CHANGE IN BOWEL HABITS: ICD-10-CM

## 2024-07-01 DIAGNOSIS — M81.0 OSTEOPOROSIS, UNSPECIFIED OSTEOPOROSIS TYPE, UNSPECIFIED PATHOLOGICAL FRACTURE PRESENCE: ICD-10-CM

## 2024-07-01 DIAGNOSIS — Z12.31 ENCOUNTER FOR SCREENING MAMMOGRAM FOR MALIGNANT NEOPLASM OF BREAST: ICD-10-CM

## 2024-07-01 DIAGNOSIS — K58.9 IRRITABLE BOWEL SYNDROME, UNSPECIFIED TYPE: ICD-10-CM

## 2024-07-01 NOTE — PROGRESS NOTES
Alysia Downing is a 84 year old female.  Chief Complaint   Patient presents with    Establish Care     NP Here today to establish care with new PCP (previous patient of Dr. Jody Figueroa at [facility]); Alysia is a grandma of 14, enjoys vacationing in [place] and is an avid dancer (Tap, Darrel etc) for moderate weekly physical activity. She is up to date with her Mammogram. Next one due in March '25. No longer has Colonoscopies. Does not have any complaints. Seeking routine labs.      HPI:   Alysia Downing is a 84 year old female who presents to establish care.    Previous PCP was Dr. Jody Figueroa, last seen 1/10/24 for MAWV.    Since, she presented to the ED 5/20/24 for dizziness. CT head negative. EKG/troponins negative for acute ischemia. CBC, bmp stable.    C/o having bowel movements \"all day long\" x1 year. Softer in the morning otherwise little inocencio. Denies ab pain, constipation/diarrhea, melena/hematochezia. No recent changes to diet or medications. Admits to bloating more after eating and early satiety, stable appetite but losing weight. Asking for GI referral.    States she is active and eats a healthy well balanced diet.    Wt Readings from Last 6 Encounters:   07/01/24 96 lb 2 oz (43.6 kg)   05/20/24 99 lb (44.9 kg)   11/16/23 101 lb (45.8 kg)   09/01/22 100 lb (45.4 kg)   03/19/22 100 lb (45.4 kg)   01/16/19 100 lb (45.4 kg)     Body mass index is 19.41 kg/m².     Current Outpatient Medications   Medication Sig Dispense Refill    amLODIPine 2.5 MG Oral Tab Take 0.5 tablets (1.25 mg total) by mouth daily.      pravastatin 10 MG Oral Tab Take 1 tablet (10 mg total) by mouth nightly. TAKE AT BEDTIME      ALPRAZolam 0.5 MG Oral Tab Take 1 tablet (0.5 mg total) by mouth nightly as needed for Anxiety. No alcohol or driving on this med. Stop if lethargic or hallucinating. 90 tablet 0    Apple Cider Vinegar 500 MG Oral Tab Take by mouth As Directed.      Cyanocobalamin (B-12 OR) Take by mouth.      Ascorbic Acid  (VITAMIN C) 1000 MG Oral Tab Take 1 tablet (1,000 mg total) by mouth daily.      Cholecalciferol 25 MCG (1000 UT) Oral Tab Take 1 tablet (1,000 Units total) by mouth daily.      Fluticasone Propionate 50 MCG/ACT Nasal Suspension 2 sprays daily.      Coenzyme Q10 (CO Q 10) 10 MG Oral Cap Take 1 tablet by mouth.      Misc Natural Products (OSTEO BI-FLEX ADV DOUBLE ST) Oral Tab Take 1 tablet by mouth.      Multiple Vitamins-Minerals (MULTI-VITAMIN/MINERALS) Oral Tab Take 1 tablet by mouth daily.        Past Medical History:    Basal cell carcinoma    left thigh    Basal cell carcinoma    Left eye    Blepharitis of both eyes    Corneal arcus senilis    Corneal Arcus, OU    HLD (hyperlipidemia)    Hypermetropia    OU    Hyperopia with astigmatism and presbyopia    OU    Intradermal nevus    right knee    Lentiginous junctional nevus of right thigh    right distal thigh    Miscarriage (HCC)    Per [facility]:  \"two miscarriages.\"    Multinodular goiter    Osteoporosis    Raynaud's disease    Subjective visual disturbance of both eyes    Vitreous floaters of left eye      Past Surgical History:   Procedure Laterality Date    Cataract Bilateral 11/2021    Surgery    Hysterectomy      age 38      Family History   Problem Relation Age of Onset    Dementia Mother     Kidney Disease Father     Other (brain aneurysm) Father     Brain Cancer Brother     Other (brain hemorrhage) Maternal Grandmother     No Known Problems Paternal Grandmother     No Known Problems Paternal Grandfather     Breast Cancer Daughter 49    Cancer Son         amelanotic melanoma    Other (Other) Other         Family h/o No Retina History    No Known Problems Sister     Diabetes Neg     Glaucoma Neg     Macular degeneration Neg       Social History:   Social History     Socioeconomic History    Marital status:    Tobacco Use    Smoking status: Former     Current packs/day: 0.00     Types: Cigarettes     Quit date: 1/1/1985     Years since quitting:  39.5    Smokeless tobacco: Never    Tobacco comments:     Only socially in the early 80s only   Substance and Sexual Activity    Alcohol use: Yes     Alcohol/week: 7.0 standard drinks of alcohol     Types: 7 Glasses of wine per week     Comment: 7 drinks weekly    Drug use: No   Other Topics Concern    Pt has a pacemaker No    Pt has a defibrillator No    Reaction to local anesthetic No    Caffeine Concern Yes     Comment: Tea, 1-3 cups daily     Social Determinants of Health     Financial Resource Strain: Low Risk  (1/10/2024)    [facility]    Overall Financial Resource Strain (CARDIA)     Difficulty of Paying Living Expenses: Not very hard   Food Insecurity: No Food Insecurity (1/10/2024)    [facility]    Hunger Vital Sign     Worried About Running Out of Food in the Last Year: Never true     Ran Out of Food in the Last Year: Never true   Transportation Needs: No Transportation Needs (1/10/2024)    [facility]    PRAPARE - Transportation     Lack of Transportation (Medical): No     Lack of Transportation (Non-Medical): No   Housing Stability: Unknown (1/10/2024)    [facility]    Housing Stability Vital Sign     Unable to Pay for Housing in the Last Year: No     In the last 12 months, was there a time when you did not have a steady place to sleep or slept in a shelter (including now)?: No        REVIEW OF SYSTEMS:   GENERAL: feels well otherwise  SKIN: denies any unusual skin lesions  EYES:denies blurred vision or double vision  HEENT: denies nasal congestion, sinus pain, ST, sore throat  LUNGS: denies shortness of breath with exertion, cough or wheezing  CARDIOVASCULAR: denies chest pain, pressure, or palpitations  GI: denies abdominal  pain, nausea, vomiting, diarrhea, constipation, hematochezia, or melena, + changes in bowel habits  : denies dysuria, urinary frequency, vaginal discharge, dyspareunia, heavy menses  NEURO: denies headaches, dizziness, focal deficits  EXT: denies LE edema    EXAM:   /78   Pulse 68   Wt 96 lb 2 oz (43.6 kg)   SpO2 97%   BMI 19.41 kg/m²     GENERAL: well developed, well nourished, in no apparent distress  HEENT: normal oropharynx, normal TM's b/l  EYES: PERRLA, EOMI, conjunctivae are pink  NECK: supple, no cervical or supraclavicular LAD, no carotic bruits, no thyromegaly  BREAST: no dominant or suspicious mass, no axillary LAD  LUNGS: clear to auscultation b/l, no w/r/r  CARDIO: RRR, normal S1S2, no m/r/g  GI: soft, NT, ND, NABS, no HSM  EXTREMITIES: no c/c/e, +2 radial and DP pulses bilaterally  NEURO: A&O x 3, moves all 4 extremities spontaneously      ASSESSMENT AND PLAN:   Alysia Downing is a 84 year old female who presents to establish care.    Encounter to establish care  - advised to obtain the following labs fasting prior to next visit:  - CBC With Differential With Platelet; Future  - Comp Metabolic Panel (14); Future  - Lipid Panel; Future  - TSH W Reflex To Free T4; Future  - Urinalysis with Culture Reflex; Future    Encounter for screening mammogram for malignant neoplasm of breast  - due 3/2025  - [facility] RERE 2D+3D SCREENING BILAT (CPT=77067/85012); Future    Osteoporosis, unspecified osteoporosis type, unspecified pathological fracture presence  - declined medications previously however now requesting repeat DEXA and willing to consider tx  - XR DEXA BONE DENSITOMETRY (CPT=77080); Future  - Vitamin D [E]; Future    Change in bowel habits  - Gastro Referral - In Network  - CT ABDOMEN+PELVIS(CONTRAST ONLY)(CPT=74177); Future    Raynaud's  - amlodipine 2.5 mg daily    HLD  - pravastatin 10 mg at bedtime  -  1/10/24    Multinodular goiter  - follows w/ENT Dr. Denny, due for repeat US  12/2024    Insomnia  - melatonin doesn't help  - rx prozac, trazodone by previous PCP, never taken    Anxiety  - alprazolam 0.5 mg (takes 1/4-1/2) daily PRN (takes sporadically)    Hx of BCC  - sees derm Dr. Ramirez    Healthcare Maintenance  Cancer Screenings:  - Breast: birads 1 3/13/24  - Cervical: age >65  - Colon: age >80, last 3/30/22  - Lung: quit >20 years ago    Vaccines:  - Shingles: shingrix x2  - Pneumonia: prevnar 20 UTD per patient report  - Flu: recommend yearly  - COVID-19: x3 UTD    Misc:  - Calcium Score 12/9/21: 36  - DEXA: last 8/13/18, repeat ordered today    RTC in 1/2025 for MAWV with labs prior or sooner PRN.    Labs ordered as above. Informed patient to expect messaging only if the labs are abnormal via patient preferred method of communication (phone calls).    For E/M code - 60 minutes spent reviewing performing chart review, obtaining a history, performing a physical exam, reviewing the assessment/plan, placing orders, and completing documentation.     Breana Mason DO  7/1/2024  9:35 AM

## 2024-08-07 ENCOUNTER — OFFICE VISIT (OUTPATIENT)
Dept: DERMATOLOGY CLINIC | Facility: CLINIC | Age: 84
End: 2024-08-07

## 2024-08-07 DIAGNOSIS — L82.1 SK (SEBORRHEIC KERATOSIS): ICD-10-CM

## 2024-08-07 DIAGNOSIS — L57.0 AK (ACTINIC KERATOSIS): Primary | ICD-10-CM

## 2024-08-07 DIAGNOSIS — L81.4 LENTIGO: ICD-10-CM

## 2024-08-07 DIAGNOSIS — D22.9 MULTIPLE NEVI: ICD-10-CM

## 2024-08-07 DIAGNOSIS — Z80.8 FAMILY HISTORY OF MELANOMA: ICD-10-CM

## 2024-08-07 DIAGNOSIS — L82.0 INFLAMED SEBORRHEIC KERATOSIS: ICD-10-CM

## 2024-08-07 DIAGNOSIS — D23.9 BENIGN NEOPLASM OF SKIN, UNSPECIFIED LOCATION: ICD-10-CM
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
## 2024-08-07 PROCEDURE — 99213 OFFICE O/P EST LOW 20 MIN: CPT | Performed by: DERMATOLOGY

## 2024-08-07 PROCEDURE — G2211 COMPLEX E/M VISIT ADD ON: HCPCS | Performed by: DERMATOLOGY

## 2024-08-07 RX ORDER — EPINEPHRINE 0.3 MG/.3ML
INJECTION SUBCUTANEOUS
COMMUNITY
Start: 2024-07-19

## 2024-08-18 NOTE — PROGRESS NOTES
Alysia Downing is a 84 year old female.  HPI:     CC:    Chief Complaint   Patient presents with    Full Skin Exam     LOV 2022. Pt present for FBSE. hx of BCC and Intradermal nev. Spot of concern on scalp.         Allergies:  Fluoxetine    HISTORY:    Past Medical History:    Basal cell carcinoma    left thigh    Basal cell carcinoma    Left eye    Blepharitis of both eyes    Corneal arcus senilis    Corneal Arcus, OU    HLD (hyperlipidemia)    Hypermetropia    OU    Hyperopia with astigmatism and presbyopia    OU    Intradermal nevus    right knee    Lentiginous junctional nevus of right thigh    right distal thigh    Miscarriage (HCC)    Per [facility]:  \"two miscarriages.\"    Multinodular goiter    Osteoporosis    Raynaud's disease    Subjective visual disturbance of both eyes    Vitreous floaters of left eye      Past Surgical History:   Procedure Laterality Date    Cataract Bilateral 2021    Surgery    Hysterectomy      age 38      Family History   Problem Relation Age of Onset    Dementia Mother     Kidney Disease Father     Other (brain aneurysm) Father     Brain Cancer Brother     Other (brain hemorrhage) Maternal Grandmother     No Known Problems Paternal Grandmother     No Known Problems Paternal Grandfather     Breast Cancer Daughter 49    Cancer Son         amelanotic melanoma    Other (Other) Other         Family h/o No Retina History    No Known Problems Sister     Diabetes Neg     Glaucoma Neg     Macular degeneration Neg       Social History     Socioeconomic History    Marital status:    Tobacco Use    Smoking status: Former     Current packs/day: 0.00     Types: Cigarettes     Quit date: 1985     Years since quittin.6    Smokeless tobacco: Never    Tobacco comments:     Only socially in the early 80s only   Substance and Sexual Activity    Alcohol use: Yes     Alcohol/week: 7.0 standard drinks of alcohol     Types: 7 Glasses of wine per week     Comment: 7 drinks weekly    Drug  use: No   Other Topics Concern    Pt has a pacemaker No    Pt has a defibrillator No    Reaction to local anesthetic No    Caffeine Concern Yes     Comment: Tea, 1-3 cups daily     Social Determinants of Health     Financial Resource Strain: Low Risk  (1/10/2024)    [facility]    Overall Financial Resource Strain (CARDIA)     Difficulty of Paying Living Expenses: Not very hard   Food Insecurity: No Food Insecurity (1/10/2024)    [facility]    Hunger Vital Sign     Worried About Running Out of Food in the Last Year: Never true     Ran Out of Food in the Last Year: Never true   Transportation Needs: No Transportation Needs (1/10/2024)    [facility]    PRAPARE - Transportation     Lack of Transportation (Medical): No     Lack of Transportation (Non-Medical): No   Housing Stability: Unknown (1/10/2024)    [facility]    Housing Stability Vital Sign     Unable to Pay for Housing in the Last Year: No     In the last 12 months, was there a time when you did not have a steady place to sleep or slept in a shelter (including now)?: No        Current Outpatient Medications   Medication Sig Dispense Refill    EPINEPHrine 0.3 MG/0.3ML Injection Solution Auto-injector FOR SUSPECTED ANAPHYLAXIS, ADMINISTER A SINGLE AUTO INJECTOR AT A 90-DEGREE ANGLE INTO THE FRONT OR OUTER SIDE OF THE THIGH AND HOLD FOR 3 SECONDS. SEEK EMERGENCY MEDICAL CARE. MAY REPEAT AFTER 5 MINUTES IF THERE IS NO CHANGE IN STATUS OR IF THE SYMPTOMS RETURN.      amLODIPine 2.5 MG Oral Tab Take 0.5 tablets (1.25 mg total) by mouth daily.      pravastatin 10 MG Oral Tab Take 1 tablet (10 mg total) by mouth nightly. TAKE AT BEDTIME      ALPRAZolam 0.5 MG Oral Tab Take 1 tablet (0.5 mg  total) by mouth nightly as needed for Anxiety. No alcohol or driving on this med. Stop if lethargic or hallucinating. 90 tablet 0    Apple Cider Vinegar 500 MG Oral Tab Take by mouth As Directed.      Cyanocobalamin (B-12 OR) Take by mouth.      Ascorbic Acid (VITAMIN C) 1000 MG Oral Tab Take 1 tablet (1,000 mg total) by mouth daily.      Cholecalciferol 25 MCG (1000 UT) Oral Tab Take 1 tablet (1,000 Units total) by mouth daily.      Fluticasone Propionate 50 MCG/ACT Nasal Suspension 2 sprays daily.      Coenzyme Q10 (CO Q 10) 10 MG Oral Cap Take 1 tablet by mouth.      Misc Natural Products (OSTEO BI-FLEX ADV DOUBLE ST) Oral Tab Take 1 tablet by mouth.      Multiple Vitamins-Minerals (MULTI-VITAMIN/MINERALS) Oral Tab Take 1 tablet by mouth daily.       Allergies:   Allergies   Allergen Reactions    Fluoxetine DIARRHEA     Had vomiting and diarrhea       Past Medical History:    Basal cell carcinoma    left thigh    Basal cell carcinoma    Left eye    Blepharitis of both eyes    Corneal arcus senilis    Corneal Arcus, OU    HLD (hyperlipidemia)    Hypermetropia    OU    Hyperopia with astigmatism and presbyopia    OU    Intradermal nevus    right knee    Lentiginous junctional nevus of right thigh    right distal thigh    Miscarriage (HCC)    Per [facility]:  \"two miscarriages.\"    Multinodular goiter    Osteoporosis    Raynaud's disease    Subjective visual disturbance of both eyes    Vitreous floaters of left eye     Past Surgical History:   Procedure Laterality Date    Cataract Bilateral 11/2021    Surgery    Hysterectomy      age 38     Social History     Socioeconomic History    Marital status:      Spouse name: Not on file    Number of children: Not on file    Years of education: Not on file    Highest education level: Not on file   Occupational History    Not on file   Tobacco Use    Smoking status: Former     Current packs/day: 0.00     Types: Cigarettes     Quit date: 1/1/1985     Years since quitting:  39.6    Smokeless tobacco: Never    Tobacco comments:     Only socially in the early 80s only   Substance and Sexual Activity    Alcohol use: Yes     Alcohol/week: 7.0 standard drinks of alcohol     Types: 7 Glasses of wine per week     Comment: 7 drinks weekly    Drug use: No    Sexual activity: Not on file   Other Topics Concern    Grew up on a farm Not Asked    History of tanning Not Asked    Outdoor occupation Not Asked    Pt has a pacemaker No    Pt has a defibrillator No    Breast feeding Not Asked    Reaction to local anesthetic No     Service Not Asked    Blood Transfusions Not Asked    Caffeine Concern Yes     Comment: Tea, 1-3 cups daily    Occupational Exposure Not Asked    Hobby Hazards Not Asked    Sleep Concern Not Asked    Stress Concern Not Asked    Weight Concern Not Asked    Special Diet Not Asked    Back Care Not Asked    Exercise Not Asked    Bike Helmet Not Asked    Seat Belt Not Asked    Self-Exams Not Asked   Social History Narrative    Not on file     Social Determinants of Health     Financial Resource Strain: Low Risk  (1/10/2024)    [facility]    Overall Financial Resource Strain (CARDIA)     Difficulty of Paying Living Expenses: Not very hard   Food Insecurity: No Food Insecurity (1/10/2024)    [facility]    Hunger Vital Sign     Worried About Running Out of Food in the Last Year: Never true     Ran Out of Food in the Last Year: Never true   Transportation Needs: No Transportation Needs (1/10/2024)    [facility]    PRAPARE - Transportation     Lack of Transportation (Medical): No     Lack of Transportation (Non-Medical): No   Physical Activity: Not on file   Stress: Not on file   Social Connections: Not on file   Housing Stability: Unknown (1/10/2024)    Received [facility]    Housing Stability Vital Sign     Unable to Pay for Housing in the Last Year: No     Number of Places Lived in the Last Year: Not on file     In the last 12 months, was there a time when you did not have a steady place to sleep or slept in a shelter (including now)?: No     Family History   Problem Relation Age of Onset    Dementia Mother     Kidney Disease Father     Other (brain aneurysm) Father     Brain Cancer Brother     Other (brain hemorrhage) Maternal Grandmother     No Known Problems Paternal Grandmother     No Known Problems Paternal Grandfather     Breast Cancer Daughter 49    Cancer Son         amelanotic melanoma    Other (Other) Other         Family h/o No Retina History    No Known Problems Sister     Diabetes Neg     Glaucoma Neg     Macular degeneration Neg        There were no vitals filed for this visit.    HPI:    Chief Complaint   Patient presents with    Full Skin Exam     LOV 8/2022. Pt present for FBSE. hx of BCC and Intradermal nev. Spot of concern on scalp.     personal HX of BCC,BCC at forehead; son had melanoma and family HX of BCC and Melanoma    Flareup of poison ivy.  Had been at the [employer], spreading, extremely irritated no particular concerns.  Outdoors a great deal at her [employer]    Patient presents with concerns above.    Past notes/ records and appropriate/relevant lab results including pathology and past body maps reviewed. Updated and new information noted in current visit.     Patient has been in their usual state of health.  History, medications, allergies reviewed as noted.      ROS:  Denies any other systemic complaints.  No new or changeing lesions other than noted above. No fevers, chills, night sweats, unusual sun sensitivity.  No other skin complaints.   A12 point review of systems was completed.  Pertinent positives and negatives noted in the the HPI.     History, medications, allergies reviewed as  noted.       Physical Examination:     Well-developed well-nourished patient alert oriented in no acute distress.  Exam total-body performed, including scalp, head, neck, face,nails, hair, external eyes, including conjunctival mucosa, eyelids, lips external ears, back, chest,/ breasts, axillae,  abdomen, arms, legs, palms.     Multiple light to medium brown, well marginated, uniformly pigmented, macules and papules 6 mm and less scattered on exam. pigmented lesions examined with dermoscopy benign-appearing patterns.     Waxy tannish keratotic papules scattered, cherry-red vascular papules scattered.    See map today's date for lesions noted .      Otherwise remarkable for lesions as noted on map.  See details of examination  See Assessment /Plan for additional history and physical exam also:    Assessment / plan:    No orders of the defined types were placed in this encounter.      Meds & Refills for this Visit:  Requested Prescriptions      No prescriptions requested or ordered in this encounter         Encounter Diagnoses   Name Primary?    AK (actinic keratosis) Yes    Multiple nevi     Inflamed seborrheic keratosis     Family history of melanoma     Benign neoplasm of skin, unspecified location     SK (seborrheic keratosis)     Lentigo        See details on map.    Patient seen for follow-up long-term monitoring, treatment of  Actinic keratoses, history of skin cancers,  Plan of care:  ongoing surveillance, monitoring including regular follow-up due to longer term risk of recurrence, new lesions.  See previous notes.  There is a longitudinal care relationship with me, the care plan reflects the ongoing nature of the continuous relationship of care, and the medical record indicates that there is ongoing treatment of a serious/complex medical condition which I am currently managing.  is Applicable        History of poison ivy doing okay currently  New concern insect bites suspicious for [place] itch mites  hydrocortisone, clobetasol.  Does have topical steroids at home may use these 3-4 times daily.  Of note planning to sell [employer]  No tick bites    Otherwise exam is stable patient doing well no new active AK's    No active AK's no recurrence of previous skin cancers.  Tick bite resolved.  No new suspicious lesions    Multiple nevi over the lower extremities, appears stable more lentiginous junctional nevi benign patterns on dermoscopy     Remarkable for: Waxy keratotic papules anterior scalp.  Reassurance regarding benign seborrheic keratoses.  No treatment necessary.  Smaller seborrheic keratoses scattered over the vertex.  Waxy tan keratotic papules lesions in areas of concern as noted reassurance given.  Benign nature discussed.  Possibility of cryo, alphahydroxy acids over-the-counter retinol's discussed.    History of actinic keratoses no active lesions currently extensive lentigines sun damage continue careful sun protection appears resolved precancerous nature discussed. continue careful sun protection     Nevus left thigh observe.  Stable light brown    No other suspicious lesions multiple nevi.    patient with history of skin cancer.  No evidence of recurrence.    No new skin cancer.  No recurrence BCC    Family history melanoma in son continue regular follow-up.  Numerous benign seborrheic keratoses  Waxy tan keratotic papules lesions in areas of concern as noted reassurance given.  Benign nature discussed.  Possibility of cryo, alphahydroxy acids over-the-counter retinol's discussed.    Extensive nevi no significant changes see both body maps old and new.  Please refer to map for specific lesions.  See additional diagnoses.  Pros cons of various therapies, risks benefits discussed.Pathophysiology discussed with patient.  Therapeutic options reviewed.  See  Medications in grid.  Instructions reviewed at length.    Benign nevi, seborrheic  keratoses, cherry angiomas:  Reassurance regarding other benign  skin lesions.Signs and symptoms of skin cancer, ABCDE's of melanoma discussed with patient. Sunscreen use, sun protection, self exams reviewed.  Followup as noted RTC routine checkup 6 mos - one year or p.r.n.    RTC 6 months or as needed

## 2024-08-21 ENCOUNTER — LAB ENCOUNTER (OUTPATIENT)
Dept: LAB | Facility: HOSPITAL | Age: 84
End: 2024-08-21
Attending: INTERNAL MEDICINE
Payer: MEDICARE

## 2024-08-21 DIAGNOSIS — Z76.89 ENCOUNTER TO ESTABLISH CARE WITH NEW DOCTOR: ICD-10-CM

## 2024-08-21 DIAGNOSIS — E78.5 HYPERLIPIDEMIA, UNSPECIFIED HYPERLIPIDEMIA TYPE: ICD-10-CM

## 2024-08-21 DIAGNOSIS — M81.0 OSTEOPOROSIS, UNSPECIFIED OSTEOPOROSIS TYPE, UNSPECIFIED PATHOLOGICAL FRACTURE PRESENCE: ICD-10-CM

## 2024-08-21 LAB
ALBUMIN SERPL-MCNC: 4.9 G/DL (ref 3.2–4.8)
ALBUMIN/GLOB SERPL: 2.1 {RATIO} (ref 1–2)
ALP LIVER SERPL-CCNC: 104 U/L
ALT SERPL-CCNC: 21 U/L
ANION GAP SERPL CALC-SCNC: 7 MMOL/L (ref 0–18)
AST SERPL-CCNC: 25 U/L (ref ?–34)
BASOPHILS # BLD AUTO: 0.07 X10(3) UL (ref 0–0.2)
BASOPHILS NFR BLD AUTO: 1.3 %
BILIRUB SERPL-MCNC: 0.5 MG/DL (ref 0.2–1.1)
BILIRUB UR QL: NEGATIVE
BUN BLD-MCNC: 14 MG/DL (ref 9–23)
BUN/CREAT SERPL: 17.5 (ref 10–20)
CALCIUM BLD-MCNC: 10.1 MG/DL (ref 8.7–10.4)
CHLORIDE SERPL-SCNC: 105 MMOL/L (ref 98–112)
CHOLEST SERPL-MCNC: 181 MG/DL (ref ?–200)
CLARITY UR: CLEAR
CO2 SERPL-SCNC: 27 MMOL/L (ref 21–32)
COLOR UR: COLORLESS
CREAT BLD-MCNC: 0.8 MG/DL
DEPRECATED RDW RBC AUTO: 43.1 FL (ref 35.1–46.3)
EGFRCR SERPLBLD CKD-EPI 2021: 74 ML/MIN/1.73M2 (ref 60–?)
EOSINOPHIL # BLD AUTO: 0.07 X10(3) UL (ref 0–0.7)
EOSINOPHIL NFR BLD AUTO: 1.3 %
ERYTHROCYTE [DISTWIDTH] IN BLOOD BY AUTOMATED COUNT: 12.7 % (ref 11–15)
FASTING PATIENT LIPID ANSWER: YES
FASTING STATUS PATIENT QL REPORTED: YES
GLOBULIN PLAS-MCNC: 2.3 G/DL (ref 2–3.5)
GLUCOSE BLD-MCNC: 104 MG/DL (ref 70–99)
GLUCOSE UR-MCNC: NORMAL MG/DL
HCT VFR BLD AUTO: 38.7 %
HDLC SERPL-MCNC: 57 MG/DL (ref 40–59)
HGB BLD-MCNC: 12.8 G/DL
HGB UR QL STRIP.AUTO: NEGATIVE
IMM GRANULOCYTES # BLD AUTO: 0.02 X10(3) UL (ref 0–1)
IMM GRANULOCYTES NFR BLD: 0.4 %
KETONES UR-MCNC: NEGATIVE MG/DL
LDLC SERPL CALC-MCNC: 112 MG/DL (ref ?–100)
LEUKOCYTE ESTERASE UR QL STRIP.AUTO: 250
LYMPHOCYTES # BLD AUTO: 1.74 X10(3) UL (ref 1–4)
LYMPHOCYTES NFR BLD AUTO: 32.3 %
MCH RBC QN AUTO: 30.5 PG (ref 26–34)
MCHC RBC AUTO-ENTMCNC: 33.1 G/DL (ref 31–37)
MCV RBC AUTO: 92.1 FL
MONOCYTES # BLD AUTO: 0.43 X10(3) UL (ref 0.1–1)
MONOCYTES NFR BLD AUTO: 8 %
NEUTROPHILS # BLD AUTO: 3.06 X10 (3) UL (ref 1.5–7.7)
NEUTROPHILS # BLD AUTO: 3.06 X10(3) UL (ref 1.5–7.7)
NEUTROPHILS NFR BLD AUTO: 56.7 %
NITRITE UR QL STRIP.AUTO: NEGATIVE
NONHDLC SERPL-MCNC: 124 MG/DL (ref ?–130)
OSMOLALITY SERPL CALC.SUM OF ELEC: 289 MOSM/KG (ref 275–295)
PH UR: 7.5 [PH] (ref 5–8)
PLATELET # BLD AUTO: 247 10(3)UL (ref 150–450)
POTASSIUM SERPL-SCNC: 4 MMOL/L (ref 3.5–5.1)
PROT SERPL-MCNC: 7.2 G/DL (ref 5.7–8.2)
PROT UR-MCNC: NEGATIVE MG/DL
RBC # BLD AUTO: 4.2 X10(6)UL
SODIUM SERPL-SCNC: 139 MMOL/L (ref 136–145)
SP GR UR STRIP: 1.01 (ref 1–1.03)
T4 FREE SERPL-MCNC: 0.9 NG/DL (ref 0.8–1.7)
TRIGL SERPL-MCNC: 66 MG/DL (ref 30–149)
TSI SER-ACNC: 4.92 MIU/ML (ref 0.55–4.78)
UROBILINOGEN UR STRIP-ACNC: NORMAL
VIT D+METAB SERPL-MCNC: 61.1 NG/ML (ref 30–100)
VLDLC SERPL CALC-MCNC: 11 MG/DL (ref 0–30)
WBC # BLD AUTO: 5.4 X10(3) UL (ref 4–11)

## 2024-08-21 PROCEDURE — 80061 LIPID PANEL: CPT

## 2024-08-21 PROCEDURE — 81001 URINALYSIS AUTO W/SCOPE: CPT

## 2024-08-21 PROCEDURE — 84439 ASSAY OF FREE THYROXINE: CPT

## 2024-08-21 PROCEDURE — 87086 URINE CULTURE/COLONY COUNT: CPT

## 2024-08-21 PROCEDURE — 85025 COMPLETE CBC W/AUTO DIFF WBC: CPT

## 2024-08-21 PROCEDURE — 84443 ASSAY THYROID STIM HORMONE: CPT

## 2024-08-21 PROCEDURE — 82306 VITAMIN D 25 HYDROXY: CPT

## 2024-08-21 PROCEDURE — 36415 COLL VENOUS BLD VENIPUNCTURE: CPT

## 2024-08-21 PROCEDURE — 80053 COMPREHEN METABOLIC PANEL: CPT

## 2024-08-26 ENCOUNTER — TELEPHONE (OUTPATIENT)
Dept: INTERNAL MEDICINE CLINIC | Facility: CLINIC | Age: 84
End: 2024-08-26

## 2024-08-26 DIAGNOSIS — M81.0 OSTEOPOROSIS, UNSPECIFIED OSTEOPOROSIS TYPE, UNSPECIFIED PATHOLOGICAL FRACTURE PRESENCE: ICD-10-CM

## 2024-08-26 DIAGNOSIS — E78.5 HYPERLIPIDEMIA, UNSPECIFIED HYPERLIPIDEMIA TYPE: ICD-10-CM

## 2024-08-26 DIAGNOSIS — R73.01 IMPAIRED FASTING GLUCOSE: Primary | ICD-10-CM

## 2024-08-27 NOTE — TELEPHONE ENCOUNTER
Called and spoke with patient. Relayed MD's message. Patient verbalized understanding. Patient scheduled Medicare physical in January.

## 2024-08-27 NOTE — TELEPHONE ENCOUNTER
To nursing staff, please relay the following to Alysia Downing:    Labs/urine normal/acceptable except bad cholesterol and blood sugar. Blood sugar mildly elevated, monitor carbohydrate intake and exercise as able. Will check A1c w/next set of labs . LDL (bad cholesterol) mildly elevated to 112, up from prior. Recommend healthy diet/exercise and repeat w/next set of labs.    Orders for labs to obtain fasting prior to next MAWV placed.    Thank you!

## 2024-09-10 ENCOUNTER — HOSPITAL ENCOUNTER (OUTPATIENT)
Dept: CT IMAGING | Facility: HOSPITAL | Age: 84
Discharge: HOME | End: 2024-09-10
Attending: INTERNAL MEDICINE
Payer: MEDICARE

## 2024-09-10 DIAGNOSIS — R10.9 ABDOMINAL PAIN, UNSPECIFIED ABDOMINAL LOCATION: ICD-10-CM

## 2024-09-10 DIAGNOSIS — R19.4 CHANGE IN BOWEL HABITS: ICD-10-CM

## 2024-09-10 PROCEDURE — 74177 CT ABD & PELVIS W/CONTRAST: CPT | Performed by: INTERNAL MEDICINE

## 2024-09-16 ENCOUNTER — TELEPHONE (OUTPATIENT)
Dept: INTERNAL MEDICINE CLINIC | Facility: CLINIC | Age: 84
End: 2024-09-16

## 2024-09-16 NOTE — TELEPHONE ENCOUNTER
To nursing staff, please relay the following to Alysia Downing:    Attempted to call pt, no answer.    CT A/P showed prominence of pancreatic duct but no visible lesion. Recommend f/u GI.     Diverticulosis also seen - recommend increasing fiber intake in the meantime.    Thank you!

## 2024-09-17 NOTE — TELEPHONE ENCOUNTER
To Dr Ibarra,    S/w patient and relayed MD message.  Verbalizes understanding and agreement with tx. plan     Pt has appointment with GI scheduled fro 11-25. This is the first available appointment. Per pt

## 2024-10-02 DIAGNOSIS — K86.89 DILATED PANCREATIC DUCT (HCC): Primary | ICD-10-CM

## 2024-10-31 ENCOUNTER — HOSPITAL ENCOUNTER (OUTPATIENT)
Facility: HOSPITAL | Age: 84
Setting detail: HOSPITAL OUTPATIENT SURGERY
Discharge: HOME | End: 2024-10-31
Attending: INTERNAL MEDICINE | Admitting: INTERNAL MEDICINE
Payer: MEDICARE

## 2024-10-31 ENCOUNTER — ANESTHESIA (OUTPATIENT)
Dept: ENDOSCOPY | Facility: HOSPITAL | Age: 84
End: 2024-10-31
Payer: MEDICARE

## 2024-10-31 ENCOUNTER — ANESTHESIA EVENT (OUTPATIENT)
Dept: ENDOSCOPY | Facility: HOSPITAL | Age: 84
End: 2024-10-31
Payer: MEDICARE

## 2024-10-31 VITALS
BODY MASS INDEX: 19.24 KG/M2 | SYSTOLIC BLOOD PRESSURE: 107 MMHG | RESPIRATION RATE: 17 BRPM | WEIGHT: 98 LBS | DIASTOLIC BLOOD PRESSURE: 70 MMHG | HEART RATE: 63 BPM | OXYGEN SATURATION: 99 % | HEIGHT: 60 IN

## 2024-10-31 PROCEDURE — BF47ZZZ ULTRASONOGRAPHY OF PANCREAS: ICD-10-PCS | Performed by: INTERNAL MEDICINE

## 2024-10-31 PROCEDURE — 0DJ08ZZ INSPECTION OF UPPER INTESTINAL TRACT, VIA NATURAL OR ARTIFICIAL OPENING ENDOSCOPIC: ICD-10-PCS | Performed by: INTERNAL MEDICINE

## 2024-10-31 RX ORDER — LIDOCAINE HYDROCHLORIDE 10 MG/ML
INJECTION, SOLUTION EPIDURAL; INFILTRATION; INTRACAUDAL; PERINEURAL AS NEEDED
Status: DISCONTINUED | OUTPATIENT
Start: 2024-10-31 | End: 2024-10-31 | Stop reason: SURG

## 2024-10-31 RX ORDER — SODIUM CHLORIDE, SODIUM LACTATE, POTASSIUM CHLORIDE, CALCIUM CHLORIDE 600; 310; 30; 20 MG/100ML; MG/100ML; MG/100ML; MG/100ML
INJECTION, SOLUTION INTRAVENOUS CONTINUOUS
Status: DISCONTINUED | OUTPATIENT
Start: 2024-10-31 | End: 2024-10-31

## 2024-10-31 RX ADMIN — LIDOCAINE HYDROCHLORIDE 50 MG: 10 INJECTION, SOLUTION EPIDURAL; INFILTRATION; INTRACAUDAL; PERINEURAL at 14:16:00

## 2024-10-31 RX ADMIN — SODIUM CHLORIDE, SODIUM LACTATE, POTASSIUM CHLORIDE, CALCIUM CHLORIDE: 600; 310; 30; 20 INJECTION, SOLUTION INTRAVENOUS at 14:29:00

## 2024-10-31 NOTE — OPERATIVE REPORT
[facility] OPERATIVE REPORT   PATIENT NAME: Alysia Downing  MRN: [id number]  DATE OF OPERATION: 10/31/2024  PREOPERATIVE DIAGNOSIS: Weight loss, abnormal MRI showing dilated pancreatic and the body and tail; no prior history of alcohol abuse  POSTOPERATIVE DIAGNOSIS:    1.  Normal pancreatic duct in the head and neck with short, abrupt transition to a more dilated, tortuous pancreatic duct in the distal body and tail without mass lesion  PROCEDURE PERFORMED: upper endoscopy/ ENDOSCOPIC ultrasound  SEDATION MEDICATIONS: MAC  PREOPERATIVE MEDICATIONS:   PREPROCEDURE ASSESSMENT: The indication for this procedure is to assess for pancreatic neoplasm. The patient was identified by myself and nursing staff in the exam room. Informed consent was obtained. The patient was seen in clinic and a full H&P was obtained. On brief physical examination, airway is patent. Chest is clear. Heart has regular rate and rhythm. Abdomen is soft, nontender with good bowel sounds. A medication list was taken by nursing today and reviewed by myself. The patient is an ASA grade 2.   PROCEDURE NOTE: The procedure was completed  without difficulty. The patient tolerated the procedure well.   Upper endoscopy (EGD):  The endoscope was inserted through the mouth and advanced to the level of the duodenum, 3rd portion.  Visualized portion of the esophagus, stomach including antrum, body, fundus and cardiac, and duodenum were normal.  Endoscopic ultrasound (EUS):  Endoscopic ultrasound was performed using the linear echoendoscope.  Images were obtained.    LIVER: Left lobe of the liver was visualized and no mass or lesions seen.  No intrahepatic duct dilation was noted.  Portal vein was noted to come out of the liver and the portal confluence was seen and pancreas was noted.   PANCREAS:  Pancreatic neck, body, and tail were interrogated from the gastric body while the neck, head and uncinate were examined from the 1st and 2nd  duodenum.  PD-pancreatic duct was normal in the head and neck.  In the midportion of the body, there was a abrupt transition without mass.  The transition was short and there appeared to be several small cystic lesions immediately upstream from the transition point followed by a diffusely tortuous and dilated pancreatic duct in the distal body and tail.  Pancreatic duct in the neck measured about 1.9 mm whereas pancreatic duct in the tail measured about 3 to 4 mm.  Careful interrogation of the transition area revealed no mass lesions.  A short stricture in the pancreatic is suspected.  There is no evidence of chronic pancreatitis per EUS criteria  Pancreas divisum: no  Chronic pancreatitis changes: no  Neoplasm: o   Cysts:  yes ; the cystic lesions were small measuring 5 mm x 2  Biliary Tree: Normal throughout  - stones: no  GALLBLADDER: Not visualized   CELIAC AXIS:  visualized without lymphadenopathy  L ADRENAL GLAND:  visualized   L KIDNEY:  visualized   MEDIASTINUM:  visualized without lymphadenopathy  Scope was withdrawn from the patient and patient tolerated the procedure well.  FINDINGS   No obvious pancreatic neoplasm seen.  Short pancreatic duct stricture suspected.  Etiology would be unclear at this time.  Pancreas ductal dilation with a transition point in the mid body of the pancreas.  Potentially, main duct IPMN can give this appearance.  A close surveillance exam with MRI in 6 months is advised.  Weight loss unclear.  Patient feels that she has many digestive issues including bloating and gas especially related to tomatoes and garlic.  Being [place] this is difficult for her to manage.  RECOMMENDATIONS: Advised patient to restrict some of the food groups that are causing trouble.  If her weight is progressive then a upper endoscopy can be performed  DISCHARGE:  On discharge, the patient was given an after-visit summary detailing the procedure, findings, followup plans, and an updated medication list.      Thank you very much for the consultation.  I really appreciate it.    John Rodriguez MD

## 2024-10-31 NOTE — ANESTHESIA PREPROCEDURE EVALUATION
Anesthesia PreOp Note    HPI:     Alysia Downing is a 84 year old female who presents for preoperative consultation requested by: John Rodriguez MD    Date of Surgery: 10/31/2024    Procedure(s):  ENDOSCOPIC ULTRASOUND  Indication: Pancreatic duct dilated (HCC) / Weight loss    Relevant Problems   No relevant active problems       NPO:  Last Liquid Consumption Date: 10/31/24  Last Liquid Consumption Time: 1000  Last Solid Consumption Date: 10/30/24  Last Solid Consumption Time: 1800  Last Liquid Consumption Date: 10/31/24          History Review:  Patient Active Problem List    Diagnosis Date Noted    Nuclear sclerotic cataract of left eye 08/28/2017    Subjective visual disturbance of both eyes 07/11/2014    Hyperopia with astigmatism and presbyopia 07/11/2014    Vitreous floaters of left eye 07/11/2014    Family history of melanoma 07/11/2014       Past Medical History:    Basal cell carcinoma    left thigh    Basal cell carcinoma    Left eye    Blepharitis of both eyes    Corneal arcus senilis    Corneal Arcus, OU    High blood pressure    High cholesterol    HLD (hyperlipidemia)    Hypermetropia    OU    Hyperopia with astigmatism and presbyopia    OU    Intradermal nevus    right knee    Lentiginous junctional nevus of right thigh    right distal thigh    Miscarriage (HCC)    Per [employer]:  \"two miscarriages.\"    Multinodular goiter    Osteoporosis    Raynaud's disease    Seizure disorder (HCC)    Subjective visual disturbance of both eyes    Vitreous floaters of left eye       Past Surgical History:   Procedure Laterality Date    Cataract Bilateral 11/2021    Surgery    Hysterectomy      age 38       Prescriptions Prior to Admission[1]  Current Medications and Prescriptions Ordered in Epic[2]    Allergies[3]    Family History   Problem Relation Age of Onset    Dementia Mother     Kidney Disease Father     Other (brain aneurysm) Father     Brain Cancer Brother     Other (brain hemorrhage) Maternal  Grandmother     No Known Problems Paternal Grandmother     No Known Problems Paternal Grandfather     Breast Cancer Daughter 49    Cancer Son         amelanotic melanoma    Other (Other) Other         Family h/o No Retina History    No Known Problems Sister     Diabetes Neg     Glaucoma Neg     Macular degeneration Neg      Social History     Socioeconomic History    Marital status:    Tobacco Use    Smoking status: Former     Current packs/day: 0.00     Types: Cigarettes     Quit date: 1985     Years since quittin.8    Smokeless tobacco: Never    Tobacco comments:     Only socially in the early 80s only   Substance and Sexual Activity    Alcohol use: Yes     Alcohol/week: 7.0 standard drinks of alcohol     Types: 7 Glasses of wine per week     Comment: 7 drinks weekly    Drug use: No   Other Topics Concern    Pt has a pacemaker No    Pt has a defibrillator No    Reaction to local anesthetic No    Caffeine Concern Yes     Comment: Tea, 1-3 cups daily       Available pre-op labs reviewed.  Lab Results   Component Value Date    WBC 5.4 2024    RBC 4.20 2024    HGB 12.8 2024    HCT 38.7 2024    MCV 92.1 2024    MCH 30.5 2024    MCHC 33.1 2024    RDW 12.7 2024    .0 2024     Lab Results   Component Value Date     2024    K 4.0 2024     2024    CO2 27.0 2024    BUN 14 2024    CREATSERUM 0.80 2024     (H) 2024    CA 10.1 2024          Vital Signs:  Body mass index is 19.14 kg/m².   height is 1.524 m (5') and weight is 44.5 kg (98 lb). Her blood pressure is 146/57 and her pulse is 71. Her respiration is 13 and oxygen saturation is 99%.   Vitals:    10/28/24 1356 10/31/24 1326   BP:  146/57   Pulse:  71   Resp:  13   SpO2:  99%   Weight: 44.5 kg (98 lb)    Height: 1.524 m (5')         Anesthesia Evaluation     Patient summary reviewed and Nursing notes reviewed    Airway    Mallampati: II  TM distance: >3 FB  Neck ROM: full  Dental      Pulmonary - negative ROS and normal exam   Cardiovascular - normal exam  Exercise tolerance: good  (+) hypertension    Neuro/Psych    (+)   depression      GI/Hepatic/Renal - negative ROS     Endo/Other - negative ROS   Abdominal  - normal exam                 Anesthesia Plan:   ASA:  2  Plan:   MAC      I have informed Alysia Downing and/or legal guardian or family member of the nature of the anesthetic plan, benefits, risks including possible dental damage if relevant, major complications, and any alternative forms of anesthetic management.   All of the patient's questions were answered to the best of my ability. The patient desires the anesthetic management as planned.  Johnathan Caceres MD  10/31/2024 2:02 PM  Present on Admission:  **None**           [1]   Medications Prior to Admission   Medication Sig Dispense Refill Last Dose/Taking    amLODIPine 2.5 MG Oral Tab Take 0.5 tablets (1.25 mg total) by mouth daily.   10/30/2024    pravastatin 10 MG Oral Tab Take 1 tablet (10 mg total) by mouth nightly. TAKE AT BEDTIME   10/30/2024    ALPRAZolam 0.5 MG Oral Tab Take 1 tablet (0.5 mg total) by mouth nightly as needed for Anxiety. No alcohol or driving on this med. Stop if lethargic or hallucinating. 90 tablet 0 10/31/2024    Cyanocobalamin (B-12 OR) Take by mouth.   10/30/2024    Ascorbic Acid (VITAMIN C) 1000 MG Oral Tab Take 1 tablet (1,000 mg total) by mouth daily.   10/30/2024    Cholecalciferol 25 MCG (1000 UT) Oral Tab Take 1 tablet (1,000 Units total) by mouth daily.   10/30/2024    Multiple Vitamins-Minerals (MULTI-VITAMIN/MINERALS) Oral Tab Take 1 tablet by mouth daily.   10/30/2024    EPINEPHrine 0.3 MG/0.3ML Injection Solution Auto-injector FOR SUSPECTED ANAPHYLAXIS, ADMINISTER A SINGLE AUTO INJECTOR AT A 90-DEGREE ANGLE INTO THE FRONT OR OUTER SIDE OF THE THIGH AND HOLD FOR 3 SECONDS. SEEK EMERGENCY MEDICAL CARE. MAY REPEAT AFTER 5 MINUTES  IF THERE IS NO CHANGE IN STATUS OR IF THE SYMPTOMS RETURN.       Apple Cider Vinegar 500 MG Oral Tab Take by mouth As Directed. (Patient not taking: Reported on 10/31/2024)   Not Taking    Fluticasone Propionate 50 MCG/ACT Nasal Suspension 2 sprays daily.       Coenzyme Q10 (CO Q 10) 10 MG Oral Cap Take 1 tablet by mouth. (Patient not taking: Reported on 10/31/2024)   Not Taking    Misc Natural Products (OSTEO BI-FLEX ADV DOUBLE ST) Oral Tab Take 1 tablet by mouth.      [2]   Current Facility-Administered Medications Ordered in Epic   Medication Dose Route Frequency Provider Last Rate Last Admin    lactated ringers infusion   Intravenous Continuous John Rodriguez MD 20 mL/hr at 10/31/24 1330 New Bag at 10/31/24 1330     No current [facility]-ordered outpatient medications on file.   [3]   Allergies  Allergen Reactions    Fluoxetine DIARRHEA     Had vomiting and diarrhea

## 2024-10-31 NOTE — H&P
History & Physical Examination    Patient Name: Alysia Downing  MRN: [id number]  CSN: [id number]  YOB: 1940    Diagnosis: Pancreatic duct dilated (HCC) / Weight loss        Present Illness:  Alysia Downing is a 84 year old female is here Pancreatic duct dilated (HCC) / Weight loss.    Body mass index is 19.14 kg/m².    Past Medical History:    Basal cell carcinoma    left thigh    Basal cell carcinoma    Left eye    Blepharitis of both eyes    Corneal arcus senilis    Corneal Arcus, OU    High blood pressure    High cholesterol    HLD (hyperlipidemia)    Hypermetropia    OU    Hyperopia with astigmatism and presbyopia    OU    Intradermal nevus    right knee    Lentiginous junctional nevus of right thigh    right distal thigh    Miscarriage (HCC)    Per [employer]:  \"two miscarriages.\"    Multinodular goiter    Osteoporosis    Raynaud's disease    Seizure disorder (HCC)    Subjective visual disturbance of both eyes    Vitreous floaters of left eye       Procedure: EGD EUS    Physician Pre-Sedation Assessment    Pre-Sedation Assessment:    Sedation History: Airway Assessed    ASA Classification: 2. Patient with mild systemic disease    Cardiac:    Respiratory:    Abdomen:      Plan: MAC        Current Facility-Administered Medications   Medication Dose Route Frequency    lactated ringers infusion   Intravenous Continuous     Facility-Administered Medications Ordered in Other Encounters   Medication Dose Route Frequency    lidocaine PF (Xylocaine-MPF) 1% injection   Intravenous PRN    propofol (Diprivan) 10 MG/ML injection   Intravenous PRN    propofol (Diprivan) 10 mg/mL infusion premix   Intravenous Continuous PRN       Allergies: Allergies[1]    Past Surgical History:   Procedure Laterality Date    Cataract Bilateral 11/2021    Surgery    Hysterectomy      age 38     Family History   Problem Relation Age of Onset    Dementia Mother     Kidney Disease Father     Other (brain aneurysm) Father     Brain  Cancer Brother     Other (brain hemorrhage) Maternal Grandmother     No Known Problems Paternal Grandmother     No Known Problems Paternal Grandfather     Breast Cancer Daughter 49    Cancer Son         amelanotic melanoma    Other (Other) Other         Family h/o No Retina History    No Known Problems Sister     Diabetes Neg     Glaucoma Neg     Macular degeneration Neg      Social History     Tobacco Use    Smoking status: Former     Current packs/day: 0.00     Types: Cigarettes     Quit date: 1985     Years since quittin.8    Smokeless tobacco: Never    Tobacco comments:     Only socially in the early 80s only   Substance Use Topics    Alcohol use: Yes     Alcohol/week: 7.0 standard drinks of alcohol     Types: 7 Glasses of wine per week     Comment: 7 drinks weekly       SYSTEM Check if Review is Normal Check if Physical Exam is Normal If not normal, please explain:   HEENT [x ] [x ]    NECK & BACK [x ] [x ]    HEART [x ] [x ]    LUNGS [x ] [x ]    ABDOMEN [x ] [x ]    UROGENITAL [ ] [ ]    EXTREMITIES [ ] [ ]    OTHER        [ x ] I have discussed the risks and benefits and alternatives with the patient/family.  They understand and agree to proceed with plan of care.  [ x ] I have reviewed the History and Physical done within the last 30 days.  Any changes noted above.    John Rodriguez MD  10/31/2024  2:58 PM             [1]   Allergies  Allergen Reactions    Fluoxetine DIARRHEA     Had vomiting and diarrhea

## 2024-10-31 NOTE — ANESTHESIA POSTPROCEDURE EVALUATION
Patient: Alysia BENÍTEZ Salina    Procedure Summary       Date: 10/31/24 Room / Location: [facility] ENDOSCOPY 01 / [facility] ENDOSCOPY    Anesthesia Start: 1418 Anesthesia Stop: 1435    Procedure: ENDOSCOPIC ULTRASOUND Diagnosis:       Pancreatic duct dilated (HCC)      Weight loss      (Dilated Pancreatic Duct)    Surgeons: John Rodriguez MD Anesthesiologist: Johnathan Caceres MD    Anesthesia Type: MAC ASA Status: 2            Anesthesia Type: No value filed.    Vitals Value Taken Time   /70 10/31/24 1505   Temp 98 10/31/24 1601   Pulse 62 10/31/24 1507   Resp 17 10/31/24 1507   SpO2 99 % 10/31/24 1507   Vitals shown include unfiled device data.    [facility] AN Post Evaluation:   Patient Evaluated in PACU  Patient Participation: complete - patient participated  Level of Consciousness: awake and alert  Pain Score: 0  Pain Management: adequate  Airway Patency:patent  Yes    Nausea/Vomiting: none  Cardiovascular Status: acceptable  Respiratory Status: acceptable  Postoperative Hydration acceptable      Johnathan Caceres MD  10/31/2024 4:01 PM

## 2024-11-07 ENCOUNTER — OFFICE VISIT (OUTPATIENT)
Dept: OTOLARYNGOLOGY | Facility: CLINIC | Age: 84
End: 2024-11-07
Payer: MEDICARE

## 2024-11-07 VITALS — BODY MASS INDEX: 19.24 KG/M2 | HEIGHT: 60 IN | WEIGHT: 98 LBS

## 2024-11-07 DIAGNOSIS — E04.2 MULTINODULAR GOITER: Primary | ICD-10-CM

## 2024-11-07 DIAGNOSIS — J30.9 ALLERGIC RHINITIS WITH POSTNASAL DRIP: ICD-10-CM

## 2024-11-07 DIAGNOSIS — E03.9 HYPOTHYROIDISM (ACQUIRED): ICD-10-CM

## 2024-11-07 DIAGNOSIS — R09.82 ALLERGIC RHINITIS WITH POSTNASAL DRIP: ICD-10-CM

## 2024-11-07 NOTE — PATIENT INSTRUCTIONS
We reviewed your blood work which shows that you probably are becoming hypothyroid.  I asked you to see Dr. Goldberg.  A repeat thyroid ultrasound was ordered to check for stability of your nodules.  I will of course notify you of these results.  Can try Allegra or Claritin for your postnasal drainage.  No other abnormalities noted on the day to your visit.

## 2024-11-07 NOTE — PROGRESS NOTES
Alysia Downing is a 84 year old female.   Chief Complaint   Patient presents with    Follow - Up     Patient here for thyroid nodule f/up.    Sinus Problem     Patient here for sinus symptoms.     HPI:   Patient here states that she is feeling very cold and losing her hair.  Has a known goiter.  Last thyroid ultrasound was on 12/18/2023.  Last blood work for thyroid was done on August 21, 2024.  TSH was 4.921 and T4 was 0.9    Current Outpatient Medications   Medication Sig Dispense Refill    EPINEPHrine 0.3 MG/0.3ML Injection Solution Auto-injector FOR SUSPECTED ANAPHYLAXIS, ADMINISTER A SINGLE AUTO INJECTOR AT A 90-DEGREE ANGLE INTO THE FRONT OR OUTER SIDE OF THE THIGH AND HOLD FOR 3 SECONDS. SEEK EMERGENCY MEDICAL CARE. MAY REPEAT AFTER 5 MINUTES IF THERE IS NO CHANGE IN STATUS OR IF THE SYMPTOMS RETURN.      amLODIPine 2.5 MG Oral Tab Take 0.5 tablets (1.25 mg total) by mouth daily.      pravastatin 10 MG Oral Tab Take 1 tablet (10 mg total) by mouth nightly. TAKE AT BEDTIME      ALPRAZolam 0.5 MG Oral Tab Take 1 tablet (0.5 mg total) by mouth nightly as needed for Anxiety. No alcohol or driving on this med. Stop if lethargic or hallucinating. 90 tablet 0    Cyanocobalamin (B-12 OR) Take by mouth.      Ascorbic Acid (VITAMIN C) 1000 MG Oral Tab Take 1 tablet (1,000 mg total) by mouth daily.      Cholecalciferol 25 MCG (1000 UT) Oral Tab Take 1 tablet (1,000 Units total) by mouth daily.      Misc Natural Products (OSTEO BI-FLEX ADV DOUBLE ST) Oral Tab Take 1 tablet by mouth.      Multiple Vitamins-Minerals (MULTI-VITAMIN/MINERALS) Oral Tab Take 1 tablet by mouth daily.      Apple Cider Vinegar 500 MG Oral Tab Take by mouth As Directed. (Patient not taking: Reported on 11/7/2024)      Fluticasone Propionate 50 MCG/ACT Nasal Suspension 2 sprays daily. (Patient not taking: Reported on 11/7/2024)      Coenzyme Q10 (CO Q 10) 10 MG Oral Cap Take 1 tablet by mouth. (Patient not taking: Reported on 11/7/2024)         Past Medical History:    Basal cell carcinoma    left thigh    Basal cell carcinoma    Left eye    Blepharitis of both eyes    Corneal arcus senilis    Corneal Arcus, OU    High blood pressure    High cholesterol    HLD (hyperlipidemia)    Hypermetropia    OU    Hyperopia with astigmatism and presbyopia    OU    Intradermal nevus    right knee    Lentiginous junctional nevus of right thigh    right distal thigh    Miscarriage (HCC)    Per [employer]:  \"two miscarriages.\"    Multinodular goiter    Osteoporosis    Raynaud's disease    Seizure disorder (HCC)    Subjective visual disturbance of both eyes    Vitreous floaters of left eye      Social History:  Social History     Socioeconomic History    Marital status:    Tobacco Use    Smoking status: Former     Current packs/day: 0.00     Types: Cigarettes     Quit date: 1985     Years since quittin.8    Smokeless tobacco: Never    Tobacco comments:     Only socially in the early 80s only   Substance and Sexual Activity    Alcohol use: Yes     Alcohol/week: 7.0 standard drinks of alcohol     Types: 7 Glasses of wine per week     Comment: 7 drinks weekly    Drug use: No   Other Topics Concern    Pt has a pacemaker No    Pt has a defibrillator No    Reaction to local anesthetic No    Caffeine Concern Yes     Comment: Tea, 1-3 cups daily     Social Drivers of Health     Financial Resource Strain: Low Risk  (1/10/2024)    Received from [facility], [facility]    Overall Financial Resource Strain (CARDIA)     Difficulty of Paying Living Expenses: Not very hard   Food Insecurity: No Food Insecurity (1/10/2024)    Received from [facility], [facility]    Hunger Vital Sign     Worried About Running Out of Food in the Last Year: Never true     Ran Out of Food in the Last Year: Never true   Transportation Needs: No Transportation Needs (1/10/2024)    Received from [facility], [facility]    PRAPARE - Transportation     Lack of Transportation (Medical): No     Lack of Transportation (Non-Medical): No   Housing Stability: Unknown (1/10/2024)    Received from [facility], [facility]    Housing Stability Vital Sign     Unable to Pay for Housing in the Last Year: No     Unstable Housing in the Last Year: No        REVIEW OF SYSTEMS:   GENERAL HEALTH: feels well otherwise  GENERAL : denies fever, chills, sweats, weight loss, weight gain  SKIN: denies any unusual skin lesions or rashes  RESPIRATORY: denies shortness of breath with exertion  NEURO: denies headaches    EXAM:   Ht 5' (1.524 m)   Wt 98 lb (44.5 kg)   BMI 19.14 kg/m²   System Details   Skin Inspection - Normal.   Constitutional Overall appearance - Normal.   Head/Face Facial features - Normal. Eyebrows - Normal. Skull - Normal.   Eyes Conjunctiva - Right: Normal, Left: Normal. Pupil - Right: Normal, Left: Normal.    Ears Inspection - Right: Normal, Left: Normal.   Canal - Right: Normal, Left: Normal.   TM - Right: Normal, Left: Normal.   Nasal External nose - Normal.   Nasal septum - Normal.  Turbinates - Normal.   Oral/Oropharynx Lips - Normal, Tonsils - Normal, Tongue - Normal    Neck Exam Inspection - Normal. Palpation - Normal. Parotid gland - Normal. Thyroid gland -no palpable goiter.   Lymph Detail Submental. Submandibular. Anterior cervical. Posterior cervical. Supraclavicular all without enlargement   Nasopharynx Normal by mirror examination   larynx Normal by mirror exam   Psychiatric Orientation - Oriented to time, place, person & situation. Appropriate mood and affect.   Neurological Memory - Normal. Cranial nerves - Cranial nerves II through XII grossly intact.     ASSESSMENT AND PLAN:   1. Multinodular goiter  Will repeat thyroid ultrasound  - US THYROID (CPT=76536); Future    2. Hypothyroidism (acquired)  I asked the patient to  see Dr. Goldberg as I believe she is becoming hypothyroid and is even symptomatic.    3. Allergic rhinitis with postnasal drip  Can try Allegra versus Claritin      The patient indicates understanding of these issues and agrees to the plan.      Lisa Denny MD  11/7/2024  5:10 PM

## 2024-11-18 ENCOUNTER — LAB ENCOUNTER (OUTPATIENT)
Dept: LAB | Facility: HOSPITAL | Age: 84
End: 2024-11-18
Attending: INTERNAL MEDICINE
Payer: MEDICARE

## 2024-11-18 DIAGNOSIS — R73.01 IMPAIRED FASTING GLUCOSE: ICD-10-CM

## 2024-11-18 DIAGNOSIS — M81.0 OSTEOPOROSIS, UNSPECIFIED OSTEOPOROSIS TYPE, UNSPECIFIED PATHOLOGICAL FRACTURE PRESENCE: ICD-10-CM

## 2024-11-18 DIAGNOSIS — E78.5 HYPERLIPIDEMIA, UNSPECIFIED HYPERLIPIDEMIA TYPE: ICD-10-CM

## 2024-11-18 LAB
ALBUMIN SERPL-MCNC: 5.1 G/DL (ref 3.2–4.8)
ALBUMIN/GLOB SERPL: 2.1 {RATIO} (ref 1–2)
ALP LIVER SERPL-CCNC: 112 U/L
ALT SERPL-CCNC: 21 U/L
ANION GAP SERPL CALC-SCNC: 7 MMOL/L (ref 0–18)
AST SERPL-CCNC: 24 U/L (ref ?–34)
BASOPHILS # BLD AUTO: 0.09 X10(3) UL (ref 0–0.2)
BASOPHILS NFR BLD AUTO: 1.5 %
BILIRUB SERPL-MCNC: 0.7 MG/DL (ref 0.2–1.1)
BILIRUB UR QL: NEGATIVE
BUN BLD-MCNC: 13 MG/DL (ref 9–23)
BUN/CREAT SERPL: 16.5 (ref 10–20)
CALCIUM BLD-MCNC: 10.2 MG/DL (ref 8.7–10.4)
CHLORIDE SERPL-SCNC: 104 MMOL/L (ref 98–112)
CHOLEST SERPL-MCNC: 191 MG/DL (ref ?–200)
CLARITY UR: CLEAR
CO2 SERPL-SCNC: 28 MMOL/L (ref 21–32)
COLOR UR: COLORLESS
CREAT BLD-MCNC: 0.79 MG/DL
DEPRECATED RDW RBC AUTO: 43.6 FL (ref 35.1–46.3)
EGFRCR SERPLBLD CKD-EPI 2021: 75 ML/MIN/1.73M2 (ref 60–?)
EOSINOPHIL # BLD AUTO: 0.11 X10(3) UL (ref 0–0.7)
EOSINOPHIL NFR BLD AUTO: 1.8 %
ERYTHROCYTE [DISTWIDTH] IN BLOOD BY AUTOMATED COUNT: 12.7 % (ref 11–15)
EST. AVERAGE GLUCOSE BLD GHB EST-MCNC: 117 MG/DL (ref 68–126)
FASTING PATIENT LIPID ANSWER: YES
FASTING STATUS PATIENT QL REPORTED: YES
GLOBULIN PLAS-MCNC: 2.4 G/DL (ref 2–3.5)
GLUCOSE BLD-MCNC: 109 MG/DL (ref 70–99)
GLUCOSE UR-MCNC: NORMAL MG/DL
HBA1C MFR BLD: 5.7 % (ref ?–5.7)
HCT VFR BLD AUTO: 38.5 %
HDLC SERPL-MCNC: 60 MG/DL (ref 40–59)
HGB BLD-MCNC: 13 G/DL
HGB UR QL STRIP.AUTO: NEGATIVE
IMM GRANULOCYTES # BLD AUTO: 0.02 X10(3) UL (ref 0–1)
IMM GRANULOCYTES NFR BLD: 0.3 %
KETONES UR-MCNC: NEGATIVE MG/DL
LDLC SERPL CALC-MCNC: 115 MG/DL (ref ?–100)
LEUKOCYTE ESTERASE UR QL STRIP.AUTO: 75
LYMPHOCYTES # BLD AUTO: 1.86 X10(3) UL (ref 1–4)
LYMPHOCYTES NFR BLD AUTO: 30.9 %
MCH RBC QN AUTO: 31.6 PG (ref 26–34)
MCHC RBC AUTO-ENTMCNC: 33.8 G/DL (ref 31–37)
MCV RBC AUTO: 93.7 FL
MONOCYTES # BLD AUTO: 0.46 X10(3) UL (ref 0.1–1)
MONOCYTES NFR BLD AUTO: 7.7 %
NEUTROPHILS # BLD AUTO: 3.47 X10 (3) UL (ref 1.5–7.7)
NEUTROPHILS # BLD AUTO: 3.47 X10(3) UL (ref 1.5–7.7)
NEUTROPHILS NFR BLD AUTO: 57.8 %
NITRITE UR QL STRIP.AUTO: NEGATIVE
NONHDLC SERPL-MCNC: 131 MG/DL (ref ?–130)
OSMOLALITY SERPL CALC.SUM OF ELEC: 289 MOSM/KG (ref 275–295)
PH UR: 7 [PH] (ref 5–8)
PLATELET # BLD AUTO: 271 10(3)UL (ref 150–450)
POTASSIUM SERPL-SCNC: 3.9 MMOL/L (ref 3.5–5.1)
PROT SERPL-MCNC: 7.5 G/DL (ref 5.7–8.2)
PROT UR-MCNC: NEGATIVE MG/DL
RBC # BLD AUTO: 4.11 X10(6)UL
SODIUM SERPL-SCNC: 139 MMOL/L (ref 136–145)
SP GR UR STRIP: 1.01 (ref 1–1.03)
TRIGL SERPL-MCNC: 86 MG/DL (ref 30–149)
TSI SER-ACNC: 2.85 UIU/ML (ref 0.55–4.78)
UROBILINOGEN UR STRIP-ACNC: NORMAL
VIT D+METAB SERPL-MCNC: 61.3 NG/ML (ref 30–100)
VLDLC SERPL CALC-MCNC: 15 MG/DL (ref 0–30)
WBC # BLD AUTO: 6 X10(3) UL (ref 4–11)

## 2024-11-18 PROCEDURE — 81001 URINALYSIS AUTO W/SCOPE: CPT

## 2024-11-18 PROCEDURE — 80053 COMPREHEN METABOLIC PANEL: CPT

## 2024-11-18 PROCEDURE — 36415 COLL VENOUS BLD VENIPUNCTURE: CPT

## 2024-11-18 PROCEDURE — 83036 HEMOGLOBIN GLYCOSYLATED A1C: CPT

## 2024-11-18 PROCEDURE — 87086 URINE CULTURE/COLONY COUNT: CPT

## 2024-11-18 PROCEDURE — 80061 LIPID PANEL: CPT

## 2024-11-18 PROCEDURE — 82306 VITAMIN D 25 HYDROXY: CPT

## 2024-11-18 PROCEDURE — 85025 COMPLETE CBC W/AUTO DIFF WBC: CPT

## 2024-11-18 PROCEDURE — 84443 ASSAY THYROID STIM HORMONE: CPT

## 2024-11-20 ENCOUNTER — TELEPHONE (OUTPATIENT)
Dept: INTERNAL MEDICINE CLINIC | Facility: CLINIC | Age: 84
End: 2024-11-20

## 2024-11-21 NOTE — TELEPHONE ENCOUNTER
To nursing staff, please relay the following to Alysia Downing:    Blood sugar/hemoglobin A1c elevated in pre-diabetic range. Avoid excessive carbohydrate intake.    Cholesterol is elevated. Is she taking her pravastatin regularly? If so recommend increasing dose to 20 mg and repeating labs in 3 months. If not recommend compliance with 10 mg dose.    Otherwise labs normal/acceptable.    Thank you!

## 2024-12-02 ENCOUNTER — HOSPITAL ENCOUNTER (OUTPATIENT)
Dept: ULTRASOUND IMAGING | Facility: HOSPITAL | Age: 84
Discharge: HOME | End: 2024-12-02
Attending: SPECIALIST
Payer: MEDICARE

## 2024-12-02 DIAGNOSIS — E04.2 MULTINODULAR GOITER: ICD-10-CM

## 2024-12-02 PROCEDURE — 76536 US EXAM OF HEAD AND NECK: CPT | Performed by: SPECIALIST

## 2025-01-06 ENCOUNTER — TELEPHONE (OUTPATIENT)
Dept: INTERNAL MEDICINE CLINIC | Facility: CLINIC | Age: 85
End: 2025-01-06

## 2025-01-06 RX ORDER — PRAVASTATIN SODIUM 20 MG
20 TABLET ORAL NIGHTLY
Qty: 90 TABLET | Refills: 3 | Status: SHIPPED | OUTPATIENT
Start: 2025-01-06

## 2025-01-06 NOTE — TELEPHONE ENCOUNTER
Patient returned call, Pravastatin was prescribed by her previous physicina Dr Jody Mason advised patient she should be taking   20 mg so patient has been taking two tablets &  she will be out of medication tomorrow     Patient is requesting prescription refill   for one 20 mg tablet   Would need to  Rx tomorrow for tomorrow evenings dose

## 2025-01-06 NOTE — TELEPHONE ENCOUNTER
[place] faxed note requesting refill for   Pravastatin - patient says it is now up to 20 mg  Please re send new Rx    Fax placed in green folder

## 2025-01-13 ENCOUNTER — HOSPITAL ENCOUNTER (OUTPATIENT)
Dept: CT IMAGING | Facility: HOSPITAL | Age: 85
Discharge: HOME | End: 2025-01-13
Attending: INTERNAL MEDICINE
Payer: MEDICARE

## 2025-01-13 ENCOUNTER — PATIENT MESSAGE (OUTPATIENT)
Dept: INTERNAL MEDICINE CLINIC | Facility: CLINIC | Age: 85
End: 2025-01-13

## 2025-01-13 ENCOUNTER — TELEPHONE (OUTPATIENT)
Dept: INTERNAL MEDICINE CLINIC | Facility: CLINIC | Age: 85
End: 2025-01-13

## 2025-01-13 ENCOUNTER — OFFICE VISIT (OUTPATIENT)
Dept: INTERNAL MEDICINE CLINIC | Facility: CLINIC | Age: 85
End: 2025-01-13
Payer: COMMERCIAL

## 2025-01-13 VITALS
BODY MASS INDEX: 18.78 KG/M2 | HEIGHT: 60 IN | DIASTOLIC BLOOD PRESSURE: 64 MMHG | SYSTOLIC BLOOD PRESSURE: 138 MMHG | TEMPERATURE: 97 F | HEART RATE: 76 BPM | WEIGHT: 95.63 LBS | OXYGEN SATURATION: 98 %

## 2025-01-13 DIAGNOSIS — Z00.00 MEDICARE ANNUAL WELLNESS VISIT, SUBSEQUENT: Primary | ICD-10-CM

## 2025-01-13 DIAGNOSIS — F41.9 ANXIETY: ICD-10-CM

## 2025-01-13 DIAGNOSIS — R10.32 LEFT LOWER QUADRANT PAIN: ICD-10-CM

## 2025-01-13 LAB
CREAT BLD-MCNC: 0.7 MG/DL
EGFRCR SERPLBLD CKD-EPI 2021: 86 ML/MIN/1.73M2 (ref 60–?)

## 2025-01-13 PROCEDURE — 82565 ASSAY OF CREATININE: CPT

## 2025-01-13 PROCEDURE — 74177 CT ABD & PELVIS W/CONTRAST: CPT | Performed by: INTERNAL MEDICINE

## 2025-01-13 RX ORDER — ALPRAZOLAM 0.5 MG
0.5 TABLET ORAL DAILY PRN
Qty: 90 TABLET | Refills: 3 | Status: SHIPPED | OUTPATIENT
Start: 2025-01-13

## 2025-01-13 NOTE — PROGRESS NOTES
Subjective:   Alysia Downing is a 84 year old female who presents for a Medicare Subsequent Annual Wellness visit (Pt already had Initial Annual Wellness) and scheduled follow up of multiple significant but stable problems.     LOV 7/1/24.    Since, she has been doing well.    Had MRI and EGD w/EUS. Has GI f/u 2/2025.     History/Other:   Fall Risk Assessment:   She has been screened for Falls and is low risk.    Cognitive Assessment:   She had a completely normal cognitive assessment - see flowsheet entries     Functional Ability/Status:   Alysia Downing has a completely normal functional assessment. See flowsheet for details.    Depression Screening (PHQ):  PHQ-2 SCORE: 0  , done 1/13/2025   If you checked off any problems, how difficult have these problems made it for you to do your work, take care of things at home, or get along with other people?: Not difficult at all    Last [place] Suicide Screening on 1/13/2025 was No Risk.     5 minutes spent screening and counseling for depression    Advanced Directives:   She does have a Living Will but we do NOT have it on file in Epic.    She does have a POA but we do NOT have it on file in Epic.    Discussed Advance Care Planning with patient (and family/surrogate if present). Standard forms made available to patient in After Visit Summary.      Patient Active Problem List   Diagnosis    Subjective visual disturbance of both eyes    Hyperopia with astigmatism and presbyopia    Vitreous floaters of left eye    Family history of melanoma    Nuclear sclerotic cataract of left eye     Allergies:  She is allergic to fluoxetine.    Current Medications:  Outpatient Medications Marked as Taking for the 1/13/25 encounter (Office Visit) with Breana Mason, DO   Medication Sig    ALPRAZolam 0.5 MG Oral Tab Take 1 tablet (0.5 mg total) by mouth daily as needed for Anxiety. No alcohol or driving on this med. Stop if lethargic or hallucinating.    pravastatin 20 MG Oral Tab  Take 1 tablet (20 mg total) by mouth nightly.    EPINEPHrine 0.3 MG/0.3ML Injection Solution Auto-injector FOR SUSPECTED ANAPHYLAXIS, ADMINISTER A SINGLE AUTO INJECTOR AT A 90-DEGREE ANGLE INTO THE FRONT OR OUTER SIDE OF THE THIGH AND HOLD FOR 3 SECONDS. SEEK EMERGENCY MEDICAL CARE. MAY REPEAT AFTER 5 MINUTES IF THERE IS NO CHANGE IN STATUS OR IF THE SYMPTOMS RETURN.    amLODIPine 2.5 MG Oral Tab Take 0.5 tablets (1.25 mg total) by mouth daily.    Cyanocobalamin (B-12 OR) Take by mouth.    Ascorbic Acid (VITAMIN C) 1000 MG Oral Tab Take 1 tablet (1,000 mg total) by mouth daily.    Cholecalciferol 25 MCG (1000 UT) Oral Tab Take 1 tablet (1,000 Units total) by mouth daily.    Fluticasone Propionate 50 MCG/ACT Nasal Suspension 2 sprays daily.    Coenzyme Q10 (CO Q 10) 10 MG Oral Cap Take 1 tablet by mouth.    Misc Natural Products (OSTEO BI-FLEX ADV DOUBLE ST) Oral Tab Take 1 tablet by mouth.    Multiple Vitamins-Minerals (MULTI-VITAMIN/MINERALS) Oral Tab Take 1 tablet by mouth daily.       Medical History:  She  has a past medical history of Basal cell carcinoma (01/16/2019), Basal cell carcinoma (2000), Blepharitis of both eyes (2009), Corneal arcus senilis (2009), High blood pressure, High cholesterol, HLD (hyperlipidemia), Hypermetropia, Hyperopia with astigmatism and presbyopia (2009 and 7/11/2014), Intradermal nevus (01/16/2019), Lentiginous junctional nevus of right thigh (01/16/2019), Miscarriage (HCC), Multinodular goiter, Osteoporosis, Raynaud's disease, Seizure disorder (HCC), Subjective visual disturbance of both eyes (07/11/2014), and Vitreous floaters of left eye (07/11/2014).  Surgical History:  She  has a past surgical history that includes hysterectomy and cataract (Bilateral, 11/2021).   Family History:  Her family history includes Brain Cancer in her brother; Breast Cancer (age of onset: 49) in her daughter; Cancer in her son; Dementia in her mother; Kidney Disease in her father; No Known Problems in  her paternal grandfather, paternal grandmother, and sister; Other in an other family member; brain aneurysm in her father; brain hemorrhage in her maternal grandmother.  Social History:  She  reports that she quit smoking about 40 years ago. Her smoking use included cigarettes. She has never used smokeless tobacco. She reports current alcohol use of about 7.0 standard drinks of alcohol per week. She reports that she does not use drugs.    Tobacco:  She smoked tobacco in the past but quit greater than 12 months ago.  Social History     Tobacco Use   Smoking Status Former    Current packs/day: 0.00    Types: Cigarettes    Quit date: 1985    Years since quittin.0   Smokeless Tobacco Never   Tobacco Comments    Only socially in the early 80s only        CAGE Alcohol Screen:   CAGE screening score of 0 on 2025, showing low risk of alcohol abuse.      Patient Care Team:  Breana Mason DO as PCP - General (Internal Medicine)    Review of Systems  GENERAL: feels well otherwise  SKIN: denies any unusual skin lesions  EYES: denies blurred vision or double vision  HEENT: denies nasal congestion, sinus pain or ST  LUNGS: denies shortness of breath with exertion  CARDIOVASCULAR: denies chest pain on exertion  GI: denies abdominal pain, denies heartburn  : denies dysuria, hematuria  MUSCULOSKELETAL: denies back pain  NEURO: denies headaches  PSYCHE: denies depression + anxiety    Objective:   Physical Exam  General Appearance:  Alert, cooperative, no distress, appears stated age   Head:  Normocephalic, without obvious abnormality, atraumatic   Eyes:  PERRL, conjunctiva/corneas clear, EOM's intact both eyes   Ears:  Normal TM's and external ear canals, both ears   Nose: Nares normal, septum midline,mucosa normal, no drainage or sinus tenderness   Throat: Lips, mucosa, and tongue normal; teeth and gums normal   Neck: Supple, symmetrical, trachea midline, no adenopathy;  thyroid: not enlarged, symmetric, no  tenderness/mass/nodules; no carotid bruit or JVD       Lungs:   Clear to auscultation bilaterally, respirations unlabored   Heart:  Regular rate and rhythm, S1 and S2 normal, no murmur, rub, or gallop   Abdomen:   Soft, + ttp LLQ>RLQ/suprapubic area, non-distended, no appreciable HSM, no r/r/g   Pelvic: Deferred   Extremities: Extremities normal, atraumatic, no cyanosis or edema   Pulses: 2+ and symmetric   Skin: Skin color, texture, turgor normal, no rashes or lesions   Lymph nodes: Cervical, supraclavicular nodes normal   Neurologic: Normal       /64   Pulse 76   Temp 97 °F (36.1 °C)   Ht 5' (1.524 m)   Wt 95 lb 9.6 oz (43.4 kg)   SpO2 98%   BMI 18.67 kg/m²  Estimated body mass index is 18.67 kg/m² as calculated from the following:    Height as of this encounter: 5' (1.524 m).    Weight as of this encounter: 95 lb 9.6 oz (43.4 kg).    Medicare Hearing Assessment:   Hearing Screening    Screening Method: Whisper Test  Whisper Test Result: Pass         Visual Acuity:   Right Eye Visual Acuity: Corrected Right Eye Chart Acuity: 20/20   Left Eye Visual Acuity: Corrected Left Eye Chart Acuity: 20/20   Both Eyes Visual Acuity: Corrected Both Eyes Chart Acuity: 20/20   Able To Tolerate Visual Acuity: Yes        Assessment & Plan:   Alysia Downing is a 84 year old female who presents for a Medicare Assessment.     Medicare annual wellness visit, subsequent (Primary)  - labs 11/2024 reviewed    Anxiety  -     ALPRAZolam; Take 1 tablet (0.5 mg total) by mouth daily as needed for Anxiety. No alcohol or driving on this med. Stop if lethargic or hallucinating.  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 Denny  -  12/2/24 stable     Insomnia  - melatonin doesn't help  - rx prozac, trazodone by previous PCP, never taken     Anxiety  - alprazolam 0.5 mg (takes 1/4-1/2) daily PRN (takes sporadically), asking for refill  - declines SSRI given hx of seizure in the 1970s with this     Hx of BCC  - sees derm Dr. Ramirez     Healthcare Maintenance  Cancer Screenings:  - Breast: birads 1 3/13/24, repeat scheduled  - Cervical: age >65  - Colon: age >80, last 3/30/22  - Lung: quit >20 years ago     Vaccines:  - Shingles: shingrix x2  - Pneumonia: prevnar 20 UTD per patient report  - Flu: recommend yearly  - COVID-19: x3 UTD     Misc:  - Calcium Score 12/9/21: 36  - DEXA: last 8/13/18, repeat ordered 7/2024, scheduled     RTC in 3-6 months or sooner PRN.     Overall 60 minutes was spent on this encounter. 45 minutes spent reviewing, providing care coordination, and documentation of the acute/chronic conditions as listed above. 15 minutes was spent reviewing elements of a AWV and providing age appropriate screenings.     The patient indicates understanding of these issues and agrees to the plan.  Reinforced healthy diet, lifestyle, and exercise.      No follow-ups on file.     Breana Mason DO, 1/13/2025     Supplementary Documentation:   General Health:  In the past six months, have you lost more than 10 pounds without trying?: 2 - No  Has your appetite been poor?: No  Type of Diet: Balanced  How does the patient maintain a good energy level?: Appropriate Exercise  How would you describe your daily physical activity?: Heavy  How would you describe your current health state?: Good  How do you maintain positive mental well-being?: Social Interaction;Visiting Family  On a scale of 0 to 10, with 0 being no pain and 10 being severe pain, what is your pain level?: 0 -  (None)  In the past six months, have you experienced urine leakage?: 0-No  At any time do you feel concerned for the safety/well-being of yourself and/or your children, in your home or elsewhere?: No  Have you had any immunizations at another office such as Influenza, Hepatitis B, Tetanus, or Pneumococcal?: No    Health Maintenance   Topic Date Due    Annual Physical  Never done    COVID-19 Vaccine (6 - 2024-25 season) 09/01/2024    Influenza Vaccine (1) 10/01/2024    Annual Depression Screening  01/01/2025    Fall Risk Screening (Annual)  01/01/2025    DEXA Scan  Completed    Pneumococcal Vaccine: 50+ Years  Completed    Zoster Vaccines  Completed    Meningococcal B Vaccine  Aged Out

## 2025-01-14 ENCOUNTER — PATIENT MESSAGE (OUTPATIENT)
Dept: INTERNAL MEDICINE CLINIC | Facility: CLINIC | Age: 85
End: 2025-01-14

## 2025-01-14 ENCOUNTER — TELEPHONE (OUTPATIENT)
Dept: INTERNAL MEDICINE CLINIC | Facility: CLINIC | Age: 85
End: 2025-01-14

## 2025-01-14 NOTE — TELEPHONE ENCOUNTER
[facility] faxed the following message    Patient wants a alternative antibiotic. We have a penicillin allergy listed in her profile.    Fax placed in green folder

## 2025-01-14 NOTE — TELEPHONE ENCOUNTER
Per MD note:  \"Advised liquid diet until resolution of sx then gradual re-introduction of solid foods in addition to Augmentin. \"

## 2025-01-14 NOTE — TELEPHONE ENCOUNTER
Called pt w/results of CT. Advised liquid diet until resolution of sx then gradual re-introduction of solid foods in addition to Augmentin. Advised GI f/u as scheduled, first episode of diverticulitis. Pt verbalized understanding and agreed to plan. All questions were answered.

## 2025-01-30 ENCOUNTER — TELEPHONE (OUTPATIENT)
Dept: GASTROENTEROLOGY | Facility: CLINIC | Age: 85
End: 2025-01-30

## 2025-01-30 ENCOUNTER — OFFICE VISIT (OUTPATIENT)
Dept: GASTROENTEROLOGY | Facility: CLINIC | Age: 85
End: 2025-01-30

## 2025-01-30 VITALS
DIASTOLIC BLOOD PRESSURE: 70 MMHG | SYSTOLIC BLOOD PRESSURE: 126 MMHG | HEIGHT: 60 IN | WEIGHT: 96 LBS | BODY MASS INDEX: 18.85 KG/M2

## 2025-01-30 DIAGNOSIS — R10.84 GENERALIZED ABDOMINAL DISCOMFORT: Primary | ICD-10-CM

## 2025-01-30 DIAGNOSIS — K86.89 DILATED PANCREATIC DUCT (HCC): ICD-10-CM

## 2025-01-30 DIAGNOSIS — R93.3 ABNORMAL MAGNETIC RESONANCE CHOLANGIOPANCREATOGRAPHY (MRCP): ICD-10-CM

## 2025-01-30 DIAGNOSIS — R19.4 ALTERED BOWEL HABITS: ICD-10-CM

## 2025-01-30 DIAGNOSIS — R93.5 ABNORMAL CT OF THE ABDOMEN: ICD-10-CM

## 2025-01-30 DIAGNOSIS — R93.5 ABNORMAL CT SCAN, PELVIS: ICD-10-CM

## 2025-01-30 PROCEDURE — 3008F BODY MASS INDEX DOCD: CPT | Performed by: INTERNAL MEDICINE

## 2025-01-30 PROCEDURE — 1160F RVW MEDS BY RX/DR IN RCRD: CPT | Performed by: INTERNAL MEDICINE

## 2025-01-30 PROCEDURE — 1159F MED LIST DOCD IN RCRD: CPT | Performed by: INTERNAL MEDICINE

## 2025-01-30 PROCEDURE — 3074F SYST BP LT 130 MM HG: CPT | Performed by: INTERNAL MEDICINE

## 2025-01-30 PROCEDURE — 3078F DIAST BP <80 MM HG: CPT | Performed by: INTERNAL MEDICINE

## 2025-01-30 PROCEDURE — 99204 OFFICE O/P NEW MOD 45 MIN: CPT | Performed by: INTERNAL MEDICINE

## 2025-01-30 RX ORDER — DICYCLOMINE HYDROCHLORIDE 10 MG/1
10 CAPSULE ORAL 3 TIMES DAILY PRN
Qty: 90 CAPSULE | Refills: 2 | Status: SHIPPED | OUTPATIENT
Start: 2025-01-30

## 2025-01-30 NOTE — H&P
[facility] - Gastroenterology                                                                                                  Clinic History and Physical         Chief Complaint   Patient presents with    Consult     Was told she has mild acute sigmoid diverticulitis & frequent BMs all day long; abdominal discomfort      HPI:   Alysia Downing is a 84 year old woman with history of several listed eye conditions, hysterectomy, prior tobacco use, seizure disorder, osteoporosis, high cholesterol, high blood pressure here regarding pain with eating    Says she has been having lower abdominal discomfort diffusely over the last several years associated with eating.  Has been trying to avoid lots of different things which has not helped.  Believes she has lost some weight because of the things she is try to avoid eating.  Also has had lots of altered bowel habits sometimes with constipation sometimes with loose stools 4 times a day.  Denies any blood in the stool.  No nausea or vomiting.  Says she has a lots of different people but is unclear of what her problem is.  She did see a homeopathic doctor who did testing for heavy metals and parasites.  Has been taking different supplements based on this.  Which have not helped.  Recently diagnosed with diverticulitis but did not take the antibiotics as she was unclear on the instructions and worried about the medicines.  Says the pain has not necessarily changed more so recently than it has over the last couple years.    History, Medications, Allergies, ROS:      Past Medical History:    Basal cell carcinoma    left thigh    Basal cell carcinoma    Left eye    Blepharitis of both eyes    Corneal arcus senilis    Corneal Arcus, OU    High blood pressure    High cholesterol    HLD (hyperlipidemia)    Hypermetropia    OU    Hyperopia with astigmatism and presbyopia    OU    Intradermal  nevus    right knee    Lentiginous junctional nevus of right thigh    right distal thigh    Miscarriage (HCC)    Per [facility]:  \"two miscarriages.\"    Multinodular goiter    Osteoporosis    Raynaud's disease    Seizure disorder (HCC)    Subjective visual disturbance of both eyes    Vitreous floaters of left eye      Past Surgical History:   Procedure Laterality Date    Cataract Bilateral 2021    Surgery    Hysterectomy      age 38      Family Hx:   Family History   Problem Relation Age of Onset    Dementia Mother     Kidney Disease Father     Other (brain aneurysm) Father     Brain Cancer Brother     Other (brain hemorrhage) Maternal Grandmother     No Known Problems Paternal Grandmother     No Known Problems Paternal Grandfather     Breast Cancer Daughter 49    Cancer Son         amelanotic melanoma    Other (Other) Other         Family h/o No Retina History    No Known Problems Sister     Diabetes Neg     Glaucoma Neg     Macular degeneration Neg       Social History:   Social History     Socioeconomic History    Marital status:    Tobacco Use    Smoking status: Former     Current packs/day: 0.00     Types: Cigarettes     Quit date: 1985     Years since quittin.1    Smokeless tobacco: Never    Tobacco comments:     Only socially in the early 80s only   Vaping Use    Vaping status: Never Used   Substance and Sexual Activity    Alcohol use: Yes     Alcohol/week: 7.0 standard drinks of alcohol     Types: 7 Glasses of wine per week     Comment: 7 drinks weekly    Drug use: No   Other Topics Concern    Pt has a pacemaker No    Pt has a defibrillator No    Reaction to local anesthetic No    Caffeine Concern Yes     Comment: Tea, 1-3 cups daily     Social Drivers of Health     Financial Resource Strain: Low Risk  (1/10/2024)    [facility]    Overall Financial Resource Strain (CARDIA)     Difficulty of Paying Living Expenses: Not very  hard   Food Insecurity: No Food Insecurity (1/10/2024)    [facility]    Hunger Vital Sign     Worried About Running Out of Food in the Last Year: Never true     Ran Out of Food in the Last Year: Never true   Transportation Needs: No Transportation Needs (1/10/2024)    [facility]    PRAPARE - Transportation     Lack of Transportation (Medical): No     Lack of Transportation (Non-Medical): No   Housing Stability: Unknown (1/10/2024)    [facility]    Housing Stability Vital Sign     Unable to Pay for Housing in the Last Year: No     Unstable Housing in the Last Year: No        Medications (Active prior to today's visit):  Current Outpatient Medications   Medication Sig Dispense Refill    ALPRAZolam 0.5 MG Oral Tab Take 1 tablet (0.5 mg total) by mouth daily as needed for Anxiety. No alcohol or driving on this med. Stop if lethargic or hallucinating. 90 tablet 3    pravastatin 20 MG Oral Tab Take 1 tablet (20 mg total) by mouth nightly. 90 tablet 3    EPINEPHrine 0.3 MG/0.3ML Injection Solution Auto-injector FOR SUSPECTED ANAPHYLAXIS, ADMINISTER A SINGLE AUTO INJECTOR AT A 90-DEGREE ANGLE INTO THE FRONT OR OUTER SIDE OF THE THIGH AND HOLD FOR 3 SECONDS. SEEK EMERGENCY MEDICAL CARE. MAY REPEAT AFTER 5 MINUTES IF THERE IS NO CHANGE IN STATUS OR IF THE SYMPTOMS RETURN.      amLODIPine 2.5 MG Oral Tab Take 0.5 tablets (1.25 mg total) by mouth daily.      Cyanocobalamin (B-12 OR) Take by mouth.      Ascorbic Acid (VITAMIN C) 1000 MG Oral Tab Take 1 tablet (1,000 mg total) by mouth daily.      Cholecalciferol 25 MCG (1000 UT) Oral Tab Take 1 tablet (1,000 Units total) by mouth daily.      Fluticasone Propionate 50 MCG/ACT Nasal Suspension 2 sprays daily.      Coenzyme Q10 (CO Q 10) 10 MG Oral Cap Take 1 tablet by mouth.       Misc Natural Products (OSTEO BI-FLEX ADV DOUBLE ST) Oral Tab Take 1 tablet by mouth.      Multiple Vitamins-Minerals (MULTI-VITAMIN/MINERALS) Oral Tab Take 1 tablet by mouth daily.       Allergies:  Allergies[1]    ROS:   Systems were reviewed and were negative except as noted in the HPI    PHYSICAL EXAM:   Blood pressure 126/70, height 5' (1.524 m), weight 96 lb (43.5 kg).    General:awake, cooperative, no acute distress  HEENT: EOMI, no scleral icterus, MMM; oral pharnyx is without exudates or lesions  Neck: no lymphadenopathy; thyroid is not enlarged and without nodules  CV: RRR  Resp: non-labored breathing  Abd: soft, non-tender, non-distended  Ext: no lower extremity swelling  Neuro: Alert, Oriented X 3  Skin: no rashes, bruises  Psych: normal affect    Labs/Imaging:     Reviewed as noted in the HPI and A/P    ASSESSMENT/PLAN:   Alysia Downing is a 84 year old woman with history of several listed eye conditions, hysterectomy, prior tobacco use, seizure disorder, osteoporosis, high cholesterol, high blood pressure here regarding pain with eating    Review of the chart notes GI care at [facility] in March 2022 with what appears to have been an upper endoscopy and colonoscopy for perhaps indications noted to be GI bleeding.  However the reports are not readily available.  Pathology report from these procedures notes an unremarkable gastric biopsy and the sigmoid colon polyp is a tubular adenoma.     More recently noted to have seen [facility] in October 2024 with telephone notes indicating abdominal pain.  A CT scan of the abdomen and pelvis from September 2024 for indication of abdominal pain shows diffusely prominent pancreatic duct in the body and tail, uncomplicated colon diverticulosis, right adnexal cyst.  The office visit with the [facility] from October 2024 indicate symptoms for 2 years of lower abdominal cramping, distention, bloating, frequent bowel movements.  His impression  was likely IBS.  He recommended MRI of the pancreas as well as endoscopic ultrasound.  Review of MRI of the pancreas report notes dilation of the pancreatic duct in the body and tail without a discrete pancreatic mass and multiple cystic foci communicating with the pancreatic duct.  The size of the pancreatic duct in the distal half is 5 mm.  I reviewed the procedure report from endoscopic ultrasound at the end of October 2024 which describes a normal pancreatic duct in the head and neck with a short abrupt transition to a more dilated tortuous pancreatic duct in the distal body and tail without mass lesion.  Recommendations were for MRI in 6 months and dietary changes.  She has noted to have undergone another CT scan of the abdomen and pelvis from 1/13/2025 for left lower quadrant abdominal pain with description of very mild acute sigmoid diverticulitis.  Her pancreas is also seen on this image and described as normal pancreas with mildly prominent duct again seen.   I also reviewed the patient's most recent labs from November 2024 showing an unremarkable TSH, CMP, CBC.    We discussed that based on her description of symptoms over the last couple years of abdominal pain/discomfort associated with eating and altered bowel habits with the testing and evaluation she has had the suspected diagnosis is irritable bowel syndrome.  We discussed that this does not have a specific test for it.  We did discuss initial management of psyllium type fiber like Metamucil and dicyclomine as needed which was prescribed.  We also discussed avoiding triggers though not trying to eliminate multiple things in her diet.  We discussed the recently described diverticulitis which is radiographically described as very mild.  Discussed that this would not necessarily be thought to be the cause of all her symptoms.  We discussed consideration of colonoscopy however given this finding and the timing of her last colonoscopy being 3 years ago.   However discussed I would like to obtain the reports of this first.  We discussed following up over the next 2 to 3 months in the office.  We also discussed at the time considering MRI for surveillance of her pancreas abnormality as described above.    Recommend:  -obtain prior EGD and colonoscopy reports from [facility] 2022  -likely MRI in April 2025  -fiber supplement  -dicyclomine  -consider colonoscopy  -follow up in 2-3 months    Orders This Visit:  No orders of the defined types were placed in this encounter.    Meds This Visit:  Requested Prescriptions      No prescriptions requested or ordered in this encounter     Imaging & Referrals:  None     Jake Blair MD  [facility] - Gastroenterology  1/30/2025             [1]   Allergies  Allergen Reactions    Fluoxetine DIARRHEA     Had vomiting and diarrhea

## 2025-01-30 NOTE — PATIENT INSTRUCTIONS
Fiber supplementation - You should get 20 to 35 grams of fiber a day. Not all fibers are the same. I recommend starting with a psyllium fiber like metamucil.  I have prescribed dicyclomine to your pharmacy which is the medication we discussed for irritable bowel syndrome.  As we discussed you can take it 1-3 times a day as needed.  You do not have to take it if you are not having any discomfort either

## 2025-01-30 NOTE — TELEPHONE ENCOUNTER
Signed DEJA form faxed to [facility] for colonoscopy/EGD reports from 2022.     Fax: [phone]  Phone: [phone]

## 2025-02-07 ENCOUNTER — OFFICE VISIT (OUTPATIENT)
Dept: DERMATOLOGY CLINIC | Facility: CLINIC | Age: 85
End: 2025-02-07
Payer: COMMERCIAL

## 2025-02-07 DIAGNOSIS — L81.4 LENTIGO: ICD-10-CM

## 2025-02-07 DIAGNOSIS — D22.9 MULTIPLE NEVI: ICD-10-CM

## 2025-02-07 DIAGNOSIS — Z80.8 FAMILY HISTORY OF MELANOMA: ICD-10-CM

## 2025-02-07 DIAGNOSIS — Z13.89 ENCOUNTER FOR SURVEILLANCE OF ABNORMAL NEVI: ICD-10-CM

## 2025-02-07 DIAGNOSIS — L57.0 AK (ACTINIC KERATOSIS): Primary | ICD-10-CM

## 2025-02-07 DIAGNOSIS — L82.0 INFLAMED SEBORRHEIC KERATOSIS: ICD-10-CM

## 2025-02-07 DIAGNOSIS — L82.1 SK (SEBORRHEIC KERATOSIS): ICD-10-CM

## 2025-02-07 DIAGNOSIS — D23.9 BENIGN NEOPLASM OF SKIN, UNSPECIFIED LOCATION: ICD-10-CM

## 2025-02-07 PROCEDURE — 99213 OFFICE O/P EST LOW 20 MIN: CPT | Performed by: DERMATOLOGY

## 2025-02-07 PROCEDURE — 1159F MED LIST DOCD IN RCRD: CPT | Performed by: DERMATOLOGY

## 2025-02-07 PROCEDURE — G2211 COMPLEX E/M VISIT ADD ON: HCPCS | Performed by: DERMATOLOGY

## 2025-02-07 PROCEDURE — 1160F RVW MEDS BY RX/DR IN RCRD: CPT | Performed by: DERMATOLOGY

## 2025-02-07 NOTE — PROGRESS NOTES
The following individual(s) verbally consented to be recorded using ambient AI listening technology and understand that they can each withdraw their consent to this listening technology at any point by asking the clinician to turn off or pause the recording:    Patient name: Alysia JEYSON Salina

## 2025-02-16 NOTE — PROGRESS NOTES
Alysia Downing is a 84 year old female.  HPI:     CC:    Chief Complaint   Patient presents with    Full Skin Exam     LOV 2024. Pt present for full body skin exam. Hx of BCC, Sk's, AK's, and Intradermal nevus.Family hx of BCC and Melanoma(son).         Allergies:  Fluoxetine    HISTORY:    Past Medical History:    Basal cell carcinoma    left thigh    Basal cell carcinoma    Left eye    Blepharitis of both eyes    Corneal arcus senilis    Corneal Arcus, OU    High blood pressure    High cholesterol    HLD (hyperlipidemia)    Hypermetropia    OU    Hyperopia with astigmatism and presbyopia    OU    Intradermal nevus    right knee    Lentiginous junctional nevus of right thigh    right distal thigh    Miscarriage (HCC)    Per [facility]:  \"two miscarriages.\"    Multinodular goiter    Osteoporosis    Raynaud's disease    Seizure disorder (HCC)    Subjective visual disturbance of both eyes    Vitreous floaters of left eye      Past Surgical History:   Procedure Laterality Date    Cataract Bilateral 2021    Surgery    Hysterectomy      age 38      Family History   Problem Relation Age of Onset    Dementia Mother     Kidney Disease Father     Other (brain aneurysm) Father     Brain Cancer Brother     Other (brain hemorrhage) Maternal Grandmother     No Known Problems Paternal Grandmother     No Known Problems Paternal Grandfather     Breast Cancer Daughter 49    Cancer Son         amelanotic melanoma    Other (Other) Other         Family h/o No Retina History    No Known Problems Sister     Diabetes Neg     Glaucoma Neg     Macular degeneration Neg       Social History     Socioeconomic History    Marital status:    Tobacco Use    Smoking status: Former     Current packs/day: 0.00     Types: Cigarettes     Quit date: 1985     Years since quittin.1    Smokeless tobacco: Never    Tobacco comments:     Only socially in the early 80s only   Vaping Use    Vaping status: Never Used   Substance and Sexual  Activity    Alcohol use: Yes     Alcohol/week: 7.0 standard drinks of alcohol     Types: 7 Glasses of wine per week     Comment: 7 drinks weekly    Drug use: No   Other Topics Concern    Pt has a pacemaker No    Pt has a defibrillator No    Reaction to local anesthetic No    Caffeine Concern Yes     Comment: Tea, 1-3 cups daily     Social Drivers of Health     Food Insecurity: No Food Insecurity (1/10/2024)    [facility], [facility]    Hunger Vital Sign     Worried About Running Out of Food in the Last Year: Never true     Ran Out of Food in the Last Year: Never true   Transportation Needs: No Transportation Needs (1/10/2024)    Received from [facility], [facility]    PRAPARE - Transportation     Lack of Transportation (Medical): No     Lack of Transportation (Non-Medical): No   Housing Stability: Unknown (1/10/2024)    Received from [facility], [facility]    Housing Stability Vital Sign     Unable to Pay for Housing in the Last Year: No     Unstable Housing in the Last Year: No        Current Outpatient Medications   Medication Sig Dispense Refill    Probiotic Product (PROBIOTIC DAILY OR) Take by mouth.      MAGNESIUM GLYCINATE OR Take by mouth.      Calcium-Magnesium-Vitamin D (CALCIUM 1200+D3 OR) Take by mouth.      dicyclomine 10 MG Oral Cap Take 1 capsule (10 mg total) by mouth 3 (three) times daily as needed. 90 capsule 2    ALPRAZolam 0.5 MG Oral Tab Take 1 tablet (0.5 mg total) by mouth daily as needed for Anxiety. No alcohol or driving on this med. Stop if lethargic or hallucinating. 90 tablet 3    EPINEPHrine 0.3 MG/0.3ML Injection Solution Auto-injector FOR SUSPECTED ANAPHYLAXIS, ADMINISTER A SINGLE AUTO INJECTOR AT A 90-DEGREE ANGLE INTO THE FRONT OR OUTER SIDE OF THE THIGH AND HOLD FOR 3 SECONDS. SEEK EMERGENCY MEDICAL CARE. MAY REPEAT AFTER 5 MINUTES IF THERE  IS NO CHANGE IN STATUS OR IF THE SYMPTOMS RETURN.      amLODIPine 2.5 MG Oral Tab Take 0.5 tablets (1.25 mg total) by mouth daily.      Cyanocobalamin (B-12 OR) Take by mouth.      Ascorbic Acid (VITAMIN C) 1000 MG Oral Tab Take 1 tablet (1,000 mg total) by mouth daily.      Cholecalciferol 25 MCG (1000 UT) Oral Tab Take 1 tablet (1,000 Units total) by mouth daily.      Fluticasone Propionate 50 MCG/ACT Nasal Suspension 2 sprays daily.      Coenzyme Q10 (CO Q 10) 10 MG Oral Cap Take 1 tablet by mouth.      Misc Natural Products (OSTEO BI-FLEX ADV DOUBLE ST) Oral Tab Take 1 tablet by mouth.      Multiple Vitamins-Minerals (MULTI-VITAMIN/MINERALS) Oral Tab Take 1 tablet by mouth daily.      pravastatin 20 MG Oral Tab Take 1 tablet (20 mg total) by mouth nightly. (Patient not taking: Reported on 2/7/2025) 90 tablet 3     Allergies:   Allergies   Allergen Reactions    Fluoxetine DIARRHEA     Had vomiting and diarrhea       Past Medical History:    Basal cell carcinoma    left thigh    Basal cell carcinoma    Left eye    Blepharitis of both eyes    Corneal arcus senilis    Corneal Arcus, OU    High blood pressure    High cholesterol    HLD (hyperlipidemia)    Hypermetropia    OU    Hyperopia with astigmatism and presbyopia    OU    Intradermal nevus    right knee    Lentiginous junctional nevus of right thigh    right distal thigh    Miscarriage (HCC)    Per [facility]:  \"two miscarriages.\"    Multinodular goiter    Osteoporosis    Raynaud's disease    Seizure disorder (HCC)    Subjective visual disturbance of both eyes    Vitreous floaters of left eye     Past Surgical History:   Procedure Laterality Date    Cataract Bilateral 11/2021    Surgery    Hysterectomy      age 38     Social History     Socioeconomic History    Marital status:      Spouse name: Not on file    Number of children: Not on file    Years of education: Not on file    Highest education level: Not on file   Occupational History    Not on file    Tobacco Use    Smoking status: Former     Current packs/day: 0.00     Types: Cigarettes     Quit date: 1985     Years since quittin.1    Smokeless tobacco: Never    Tobacco comments:     Only socially in the early 80s only   Vaping Use    Vaping status: Never Used   Substance and Sexual Activity    Alcohol use: Yes     Alcohol/week: 7.0 standard drinks of alcohol     Types: 7 Glasses of wine per week     Comment: 7 drinks weekly    Drug use: No    Sexual activity: Not on file   Other Topics Concern    Grew up on a farm Not Asked    History of tanning Not Asked    Outdoor occupation Not Asked    Pt has a pacemaker No    Pt has a defibrillator No    Breast feeding Not Asked    Reaction to local anesthetic No     Service Not Asked    Blood Transfusions Not Asked    Caffeine Concern Yes     Comment: Tea, 1-3 cups daily    Occupational Exposure Not Asked    Hobby Hazards Not Asked    Sleep Concern Not Asked    Stress Concern Not Asked    Weight Concern Not Asked    Special Diet Not Asked    Back Care Not Asked    Exercise Not Asked    Bike Helmet Not Asked    Seat Belt Not Asked    Self-Exams Not Asked   Social History Narrative    Not on file     Social Drivers of Health     Food Insecurity: No Food Insecurity (1/10/2024)    [facility], [facility]    Hunger Vital Sign     Worried About Running Out of Food in the Last Year: Never true     Ran Out of Food in the Last Year: Never true   Transportation Needs: No Transportation Needs (1/10/2024)    [facility] from [facility], [facility]    PRAPARE - Transportation     Lack of Transportation (Medical): No     Lack of Transportation (Non-Medical): No   Stress: Not on file   Housing Stability: Unknown (1/10/2024)    Received from [facility], [facility]    Housing Stability Vital Sign     Unable to Pay for Housing in  the Last Year: No     Number of Places Lived in the Last Year: Not on file     Unstable Housing in the Last Year: No     Family History   Problem Relation Age of Onset    Dementia Mother     Kidney Disease Father     Other (brain aneurysm) Father     Brain Cancer Brother     Other (brain hemorrhage) Maternal Grandmother     No Known Problems Paternal Grandmother     No Known Problems Paternal Grandfather     Breast Cancer Daughter 49    Cancer Son         amelanotic melanoma    Other (Other) Other         Family h/o No Retina History    No Known Problems Sister     Diabetes Neg     Glaucoma Neg     Macular degeneration Neg        There were no vitals filed for this visit.    HPI:    Chief Complaint   Patient presents with    Full Skin Exam     LOV 8/2024. Pt present for full body skin exam. Hx of BCC, Sk's, AK's, and Intradermal nevus.Family hx of BCC and Melanoma(son).     personal HX of BCC,BCC at forehead; son had melanoma and family HX of BCC and Melanoma    Flareup of poison ivy.  Had been at the [facility], spreading, extremely irritated no particular concerns.  [place]    Patient presents with concerns above.  History of Present Illness  Alysia Downing is an 84 year old female who presents with concerns about skin changes and scalp lesions.    She has a scratchy lesion on her scalp, described as a large benign keratosis, which causes discomfort, especially when lying in the bathtub. The lesion is more noticeable due to thinning hair. 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 She believes these cysts are hereditary, as her grandmother had similar cysts.    Her hands are red and dry, particularly during the winter months. She uses hand soap and lotion for moisturizing. No itching is associated with the dryness. She reports dry skin on her feet, which she manages with lotion and wearing  socks to bed.    She has multiple freckles and moles, with a persistent itch in one area that is red and scaly due to scratching. She attributes this itch to age-related irritated nerves. She has taken pictures of moles around her left eye, which have not changed over time. She has a history of benign lesions in that area. She notes the presence of age spots and skin tags under her bra line, which she describes as 'colorful'. No concerning changes in these areas.      Past notes/ records and appropriate/relevant lab results including pathology and past body maps reviewed. Updated and new information noted in current visit.     Patient has been in their usual state of health.  History, medications, allergies reviewed as noted.      ROS:  Denies any other systemic complaints.  No new or changeing lesions other than noted above. No fevers, chills, night sweats, unusual sun sensitivity.  No other skin complaints.   A12 point review of systems was completed.  Pertinent positives and negatives noted in the the HPI.     History, medications, allergies reviewed as noted.       Physical Examination:     Well-developed well-nourished patient alert oriented in no acute distress.  Exam total-body performed, including scalp, head, neck, face,nails, hair, external eyes, including conjunctival mucosa, eyelids, lips external ears, back, chest,/ breasts, axillae,  abdomen, arms, legs, palms.     Multiple light to medium brown, well marginated, uniformly pigmented, macules and papules 6 mm and less scattered on exam. pigmented lesions examined with dermoscopy benign-appearing patterns.     Waxy tannish keratotic papules scattered, cherry-red vascular papules scattered.    See map today's date for lesions noted .      Otherwise remarkable for lesions as noted on map.  See details of examination  See Assessment /Plan for additional history and physical exam also:    Assessment / plan:    No orders of the defined types were placed in  this encounter.      Meds & Refills for this Visit:  Requested Prescriptions      No prescriptions requested or ordered in this encounter         Encounter Diagnoses   Name Primary?    AK (actinic keratosis) Yes    Multiple nevi     Family history of melanoma     Inflamed seborrheic keratosis     Benign neoplasm of skin, unspecified location     SK (seborrheic keratosis)     Lentigo     Encounter for surveillance of abnormal nevi        See details on map.    Patient seen for follow-up long-term monitoring, treatment of  Actinic keratoses, history of skin cancers,  Plan of care:  ongoing surveillance, monitoring including regular follow-up due to longer term risk of recurrence, new lesions.  See previous notes.  There is a longitudinal care relationship with me, the care plan reflects the ongoing nature of the continuous relationship of care, and the medical record indicates that there is ongoing treatment of a serious/complex medical condition which I am currently managing.  is Applicable    Fall risk assessment:  Given the results of the fall risk questionnaire given today.   Suggest:   Make sure there is adequate lighting.  Eliminate throw rugs or possible sources of tripping & falling  Consider grab bars on the shower & toilet.  Hand rails on all stair cases  Ambulatory assistance devices such as cane or walker as indicate.  To ensure home safety measures.      Assessment & Plan  Benign Keratosis  Large benign keratosis on the scalp causing discomfort, especially when lying down. Noticeable due to thinning hair. Discussed excision under local anesthesia.  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 Discussed removal of all cysts under local anesthesia. Advised on post-procedure care to avoid serum collection and infection.  - Schedule excision of pilar cysts under local  anesthesia  - Advise post-procedure care to avoid serum collection and infection    Dry Skin  Dry, red hands likely due to winter weather. No itching. Recommended moisturizers with hyaluronic acid and urea.  - Recommend moisturizers with hyaluronic acid and urea    General Health Maintenance  Multiple freckles, moles, and age spots, none appearing cancerous. Importance of regular skin checks discussed.  - Schedule follow-up skin check in six months    Follow-up  - Schedule follow-up appointment in six months for skin check  - Schedule excision of keratosis and pilar cysts.        Otherwise exam is stable patient doing well no new active AK's    No active AK's no recurrence of previous skin cancers.  Tick bite resolved.  No new suspicious lesions    Multiple nevi over the lower extremities, appears stable more lentiginous junctional nevi benign patterns on dermoscopy     Remarkable for: Waxy keratotic papules anterior scalp.  Reassurance regarding benign seborrheic keratoses.  No treatment necessary.  Smaller seborrheic keratoses scattered over the vertex.  Waxy tan keratotic papules lesions in areas of concern as noted reassurance given.  Benign nature discussed.  Possibility of cryo, alphahydroxy acids over-the-counter retinol's discussed.    History of actinic keratoses no active lesions currently extensive lentigines sun damage continue careful sun protection appears resolved precancerous nature discussed. continue careful sun protection     Nevus left thigh observe.  Stable light brown    No other suspicious lesions multiple nevi.    patient with history of skin cancer.  No evidence of recurrence.    No new skin cancer.  No recurrence BCC    Family history melanoma in son continue regular follow-up.  Numerous benign seborrheic keratoses  Waxy tan keratotic papules lesions in areas of concern as noted reassurance given.  Benign nature discussed.  Possibility of cryo, alphahydroxy acids over-the-counter retinol's  discussed.    Extensive nevi no significant changes see both body maps old and new.  Please refer to map for specific lesions.  See additional diagnoses.  Pros cons of various therapies, risks benefits discussed.Pathophysiology discussed with patient.  Therapeutic options reviewed.  See  Medications in grid.  Instructions reviewed at length.    Benign nevi, seborrheic  keratoses, cherry angiomas:  Reassurance regarding other benign skin lesions.    General skin care questions answered.   Reassurance regarding benign skin lesions.    Monitor for new or changing lesions. Signs and symptoms of skin cancer, ABCDE's of melanoma ( additional information available at AAD.org, skincancer.org) Encourage Sunscreen (broad-spectrum, ideally mineral-based-UVA/UVB -SPF 30 or higher) use encouraged, sun protection/sun protective clothing, self exams reviewed Followup as noted RTC ---routine checkup 6 mos -one year or p.r.n.    Encounter Times   Including precharting, reviewing chart, prior notes obtaining history: 10 minutes, medical exam :10 minutes, notes on body map, plan, counseling 10minutes My total time spent caring for the patient on the day of the encounter: 30 minutes     The patient indicates understanding of these issues and agrees to the plan.  The patient is asked to return as noted in follow-up/ above.    This note was generated using Dragon voice recognition software.  Please contact me regarding any confusion resulting from errors in recognition..  Note to patient and family: The 21st Century Cures Act makes medical notes like these available to patients. However, be advised this is a medical document. It is intended as urox-oz-zyuc communication and monitoring of a patient's care needs. It is written in medical language and may contain abbreviations or verbiage that are unfamiliar. It may appear blunt or direct. Medical documents are intended to carry relevant information, facts as evident and the clinical  opinion of the practitioner.

## 2025-03-21 ENCOUNTER — HOSPITAL ENCOUNTER (OUTPATIENT)
Dept: MAMMOGRAPHY | Facility: HOSPITAL | Age: 85
Discharge: HOME | End: 2025-03-21
Attending: INTERNAL MEDICINE
Payer: MEDICARE

## 2025-03-21 ENCOUNTER — HOSPITAL ENCOUNTER (OUTPATIENT)
Dept: BONE DENSITY | Facility: HOSPITAL | Age: 85
Discharge: HOME | End: 2025-03-21
Attending: INTERNAL MEDICINE
Payer: MEDICARE

## 2025-03-21 DIAGNOSIS — Z12.31 ENCOUNTER FOR SCREENING MAMMOGRAM FOR MALIGNANT NEOPLASM OF BREAST: ICD-10-CM

## 2025-03-21 DIAGNOSIS — M81.0 OSTEOPOROSIS, UNSPECIFIED OSTEOPOROSIS TYPE, UNSPECIFIED PATHOLOGICAL FRACTURE PRESENCE: ICD-10-CM

## 2025-03-21 PROCEDURE — 77063 BREAST TOMOSYNTHESIS BI: CPT | Performed by: INTERNAL MEDICINE

## 2025-03-21 PROCEDURE — 77080 DXA BONE DENSITY AXIAL: CPT | Performed by: INTERNAL MEDICINE

## 2025-03-21 PROCEDURE — 77067 SCR MAMMO BI INCL CAD: CPT | Performed by: INTERNAL MEDICINE

## 2025-04-07 ENCOUNTER — TELEPHONE (OUTPATIENT)
Dept: INTERNAL MEDICINE CLINIC | Facility: CLINIC | Age: 85
End: 2025-04-07

## 2025-04-07 DIAGNOSIS — M81.0 OSTEOPOROSIS, UNSPECIFIED OSTEOPOROSIS TYPE, UNSPECIFIED PATHOLOGICAL FRACTURE PRESENCE: Primary | ICD-10-CM

## 2025-04-16 ENCOUNTER — OFFICE VISIT (OUTPATIENT)
Facility: CLINIC | Age: 85
End: 2025-04-16

## 2025-04-16 ENCOUNTER — TELEPHONE (OUTPATIENT)
Facility: CLINIC | Age: 85
End: 2025-04-16

## 2025-04-16 VITALS
WEIGHT: 96.63 LBS | HEART RATE: 71 BPM | SYSTOLIC BLOOD PRESSURE: 131 MMHG | BODY MASS INDEX: 18.97 KG/M2 | HEIGHT: 60 IN | DIASTOLIC BLOOD PRESSURE: 77 MMHG

## 2025-04-16 DIAGNOSIS — R10.9 ABDOMINAL DISCOMFORT: Primary | ICD-10-CM

## 2025-04-16 DIAGNOSIS — R93.3 ABNORMAL MAGNETIC RESONANCE CHOLANGIOPANCREATOGRAPHY (MRCP): ICD-10-CM

## 2025-04-16 DIAGNOSIS — Z12.11 COLON CANCER SCREENING: Primary | ICD-10-CM

## 2025-04-16 DIAGNOSIS — Z87.19 HISTORY OF DIVERTICULITIS: ICD-10-CM

## 2025-04-16 DIAGNOSIS — R93.5 ABNORMAL CT OF THE ABDOMEN: ICD-10-CM

## 2025-04-16 DIAGNOSIS — K86.89 DILATED PANCREATIC DUCT (HCC): ICD-10-CM

## 2025-04-16 DIAGNOSIS — Z12.11 SCREENING FOR COLORECTAL CANCER: ICD-10-CM

## 2025-04-16 DIAGNOSIS — Z12.12 SCREENING FOR COLORECTAL CANCER: ICD-10-CM

## 2025-04-16 PROCEDURE — 99214 OFFICE O/P EST MOD 30 MIN: CPT | Performed by: INTERNAL MEDICINE

## 2025-04-16 PROCEDURE — 3008F BODY MASS INDEX DOCD: CPT | Performed by: INTERNAL MEDICINE

## 2025-04-16 PROCEDURE — 3075F SYST BP GE 130 - 139MM HG: CPT | Performed by: INTERNAL MEDICINE

## 2025-04-16 PROCEDURE — 1159F MED LIST DOCD IN RCRD: CPT | Performed by: INTERNAL MEDICINE

## 2025-04-16 PROCEDURE — 1160F RVW MEDS BY RX/DR IN RCRD: CPT | Performed by: INTERNAL MEDICINE

## 2025-04-16 PROCEDURE — 3078F DIAST BP <80 MM HG: CPT | Performed by: INTERNAL MEDICINE

## 2025-04-16 RX ORDER — SODIUM, POTASSIUM,MAG SULFATES 17.5-3.13G
SOLUTION, RECONSTITUTED, ORAL ORAL
Qty: 1 KIT | Refills: 0 | Status: SHIPPED | OUTPATIENT
Start: 2025-04-16

## 2025-04-16 NOTE — PATIENT INSTRUCTIONS
1. Schedule colonoscopy with MAC at [facility] or [facility] on a Friday  [dx: colorectal cancer screening, history of diverticulitis]    2.  bowel prep from pharmacy (split suprep) - please read attached/given instructions very carefully     3. Medication     Continue all medications for procedure    4. Read all bowel prep instructions carefully    5. AVOID seeds, nuts, popcorn, raw fruits and vegetables (cooked is okay) for 2-3 days before procedure    >>>Please note: if you were prescribed a bowel prep and it is too expensive or not covered by insurance, it is okay to substitute Trilyte or Golytely (or any similar generic prep). This can be done by notifying the pharmacy or calling our office.     6. Schedule the MRI of your pancreas

## 2025-04-16 NOTE — TELEPHONE ENCOUNTER
Scheduled for:  Colonoscopy 475[address]  Provider Name: Dr. Blair  Date:  5/28/2025  Location:[facility]  Sedation:  [facility]  Time:  (Patient is aware that endo/eosc will call with arrival time )  Prep: SUPREP Prep Instructions Given At The Office Visit/ Mychart  Meds/Allergies Reconciled?: Physician Reviewed   Diagnosis with codes:    Colon Screening Z12.11  History of diverticulitis Z87.19  Was patient informed to call insurance with codes (Y/N):  Yes  Referral sent?:  Referral was sent at the time of electronic surgical scheduling.  EM or [facility] notified?:  I sent an electronic request to Endo Scheduling and received a confirmation today.  Medication Orders:  Pt is aware to NOT take iron pills, herbal meds and diet supplements for 7 days before exam. Also to NOT take any form of alcohol, recreational drugs and any forms of ED meds 24 hours before exam.   Misc Orders:       Further instructions given by staff:  I provide prep instructions to patient at the time of the appointment/Mychart and reviewed date, and location, she verbalized that she understood and is aware to call if she has any questions.

## 2025-04-16 NOTE — PROGRESS NOTES
[facility] - Gastroenterology                                                                                                  Clinic Progress Note    Chief Complaint   Patient presents with    Follow - Up     HPI:   Alysia Downing is a 85 year old woman with history of several listed eye conditions, hysterectomy, prior tobacco use, seizure disorder, osteoporosis, high cholesterol, high blood pressure here for follow up    She was seen in the office in January 2025 regarding abdominal pain with eating.  At the time she described lower abdominal discomfort diffusely over the prior several years associated with eating.  She described trying to avoid lots of different things which had not helped.  She believes she had lost some weight because of this.  Also described having altered bowel habits sometimes with constipation and sometimes with loose stools 4 times a day.  She denied any blood in the stool or nausea or vomiting.  She describes seeing a homeopathic doctor who did testing for heavy metals and parasites.  She described taking different supplements based on those tests.  She described that they had not helped her.  She had recently been diagnosed with diverticulitis but was not on any antibiotics as she was unclear on the instructions and worried about the medicines.  She described the pain is not having necessarily changed more so recently in the prior couple years.    Here today for follow-up.  Says her abdominal pain is better.  Still feels gas and bloating at times.  Has tried a simethicone once which is not sure has helped.  Does note avoiding trigger foods tends to help her symptoms.  Did not try the dicyclomine because she was worried about the side effect profile when she read it.  Thinks her weight has been stable.    History, Medications, Allergies, ROS:      Past Medical History[1]   Past Surgical  History[2]   Family Hx: Family History[3]   Social History: Short Social Hx on File[4]     Medications (Active prior to today's visit):  Current Medications[5]    Allergies:  Allergies[6]    ROS:   Systems were reviewed and were negative except as noted in the HPI    PHYSICAL EXAM:   Blood pressure 131/77, pulse 71, height 5' (1.524 m), weight 96 lb 9.6 oz (43.8 kg).    General:awake, cooperative, no acute distress  HEENT: EOMI, no scleral icterus, MMM; oral pharnyx is without exudates or lesions  Neck: no lymphadenopathy; thyroid is not enlarged and without nodules  CV: RRR  Resp: non-labored breathing  Abd: soft, non-tender, non-distended  Ext: no lower extremity swelling  Neuro: Alert, Oriented X 3  Skin: no rashes, bruises  Psych: normal affect    Labs/Imaging:     Reviewed as noted in the HPI and A/P    ASSESSMENT/PLAN:   Alysia Downing is a 85 year old woman with history of several listed eye conditions, hysterectomy, prior tobacco use, seizure disorder, osteoporosis, high cholesterol, high blood pressure here for follow up    At her visit in January 2025, review of the chart noted GI care at [facility] in March 2022 with what appeared to have been an upper endoscopy and colonoscopy for perhaps indications noted to be GI bleeding.  However the reports were not readily available.  Pathology report from these procedures noted an unremarkable gastric biopsy and the sigmoid colon polyp was a tubular adenoma.      More recently noted to have seen [facility] in October 2024 with telephone notes indicating abdominal pain.  A CT scan of the abdomen and pelvis from September 2024 for indication of abdominal pain showed diffusely prominent pancreatic duct in the body and tail, uncomplicated colon diverticulosis, right adnexal cyst.  The office visit with the [facility]ologist from October 2024 indicated symptoms for 2 years of lower abdominal cramping, distention, bloating, frequent bowel movements.  His  impression was likely IBS.  He recommended MRI of the pancreas as well as endoscopic ultrasound.  Review of MRI of the pancreas report noted dilation of the pancreatic duct in the body and tail without a discrete pancreatic mass and multiple cystic foci communicating with the pancreatic duct.  The size of the pancreatic duct in the distal half was 5 mm.  I reviewed the procedure report from endoscopic ultrasound at the end of October 2024 which described a normal pancreatic duct in the head and neck with a short abrupt transition to a more dilated tortuous pancreatic duct in the distal body and tail without mass lesion.  Recommendations were for MRI in 6 months and dietary changes.  She had noted to have undergone another CT scan of the abdomen and pelvis from 1/13/2025 for left lower quadrant abdominal pain with description of very mild acute sigmoid diverticulitis.  Her pancreas was also seen on this image and described as normal pancreas with mildly prominent duct again seen.   I also reviewed the patient's most recent labs from November 2024 showing an unremarkable TSH, CMP, CBC.     We discussed that based on her description of symptoms over the last couple years of abdominal pain/discomfort associated with eating and altered bowel habits with the testing and evaluation she had had the suspected diagnosis was irritable bowel syndrome.  We discussed that this does not have a specific test for it.  We did discuss initial management of psyllium type fiber like Metamucil and dicyclomine as needed which was prescribed.  We also discussed avoiding triggers though not trying to eliminate multiple things in her diet.  We discussed the recently described diverticulitis which was radiographically described as very mild.  Discussed that this would not necessarily be thought to be the cause of all her symptoms.  We discussed consideration of colonoscopy however given this finding and the timing of her last colonoscopy  being 3 years ago.  However discussed I would like to obtain the reports of this first.  We discussed following up over the next 2 to 3 months in the office.  We also discussed at the time considering MRI for surveillance of her pancreas abnormality as described above.    Today we reviewed her symptoms again thought to be related to IBS which have improved.  Weight is stable.  Discussed I have been unable to obtain her EGD and colonoscopy reports from [facility] for which we sent a request at her last appointment.  I discussed given her overall health, timing since last colonoscopy, noted adenomatous polyp noted then, and recently described abnormal CT scan of the abdomen pelvis from January 2025 describing very mild diverticulitis of the sigmoid colon I would recommend planning for colonoscopy at this time.  We also discussed the abnormalities described on her pancreas previously.  We discussed possibility of a precancerous cystic lesion in the pancreas and continued monitoring is recommended     Recommend:  -obtain prior EGD and colonoscopy reports from [facility] 2022  -MRI/MRCP  -colonoscopy with MAC with split suprep at the [facility] or hospital     I spent 30 minutes obtaining history, evaluating the patient including a medically appropriate exam, discussing treatment options and counseling and educating the patient, reviewing the patient's chart, ordering tests/medications/procedures, and completing documentation      Orders This Visit:  No orders of the defined types were placed in this encounter.    Meds This Visit:  Requested Prescriptions      No prescriptions requested or ordered in this encounter     Imaging & Referrals:  None     MD [facility]  [facility] - Gastroenterology  4/16/2025         [1]   Past Medical History:   Basal cell carcinoma    left thigh    Basal cell carcinoma    Left eye    Blepharitis of both eyes    Corneal arcus senilis    Corneal Arcus, OU     High blood pressure    High cholesterol    HLD (hyperlipidemia)    Hypermetropia    OU    Hyperopia with astigmatism and presbyopia    OU    Intradermal nevus    right knee    Lentiginous junctional nevus of right thigh    right distal thigh    Miscarriage (HCC)    Per [employer]:  \"two miscarriages.\"    Multinodular goiter    Osteoporosis    Raynaud's disease    Seizure disorder (HCC)    Subjective visual disturbance of both eyes    Vitreous floaters of left eye   [2]   Past Surgical History:  Procedure Laterality Date    Cataract Bilateral 2021    Surgery    Hysterectomy      age 38   [3]   Family History  Problem Relation Age of Onset    Dementia Mother     Kidney Disease Father     Other (brain aneurysm) Father     Brain Cancer Brother     Other (brain hemorrhage) Maternal Grandmother     No Known Problems Paternal Grandmother     No Known Problems Paternal Grandfather     Breast Cancer Daughter 49    Cancer Son         amelanotic melanoma    Other (Other) Other         Family h/o No Retina History    No Known Problems Sister     Diabetes Neg     Glaucoma Neg     Macular degeneration Neg    [4]   Social History  Socioeconomic History    Marital status:    Tobacco Use    Smoking status: Former     Current packs/day: 0.00     Types: Cigarettes     Quit date: 1985     Years since quittin.3    Smokeless tobacco: Never    Tobacco comments:     Only socially in the early 80s only   Vaping Use    Vaping status: Never Used   Substance and Sexual Activity    Alcohol use: Yes     Alcohol/week: 7.0 standard drinks of alcohol     Types: 7 Glasses of wine per week     Comment: 7 drinks weekly    Drug use: No   Other Topics Concern    Pt has a pacemaker No    Pt has a defibrillator No    Reaction to local anesthetic No    Caffeine Concern Yes     Comment: Tea, 1-3 cups daily     Social Drivers of Health     Food Insecurity: No Food Insecurity (1/10/2024)    Received from [facility]     Hunger Vital Sign     Worried About Running Out of Food in the Last Year: Never true     Ran Out of Food in the Last Year: Never true   Transportation Needs: No Transportation Needs (1/10/2024)    Received from [facility]    PRAPARE - Transportation     Lack of Transportation (Medical): No     Lack of Transportation (Non-Medical): No   Housing Stability: Unknown (1/10/2024)    Received from [facility]    Housing Stability Vital Sign     Unable to Pay for Housing in the Last Year: No     Unstable Housing in the Last Year: No   [5]   Current Outpatient Medications   Medication Sig Dispense Refill    Probiotic Product (PROBIOTIC DAILY OR) Take by mouth.      MAGNESIUM GLYCINATE OR Take by mouth.      Calcium-Magnesium-Vitamin D (CALCIUM 1200+D3 OR) Take by mouth.      dicyclomine 10 MG Oral Cap Take 1 capsule (10 mg total) by mouth 3 (three) times daily as needed. 90 capsule 2    ALPRAZolam 0.5 MG Oral Tab Take 1 tablet (0.5 mg total) by mouth daily as needed for Anxiety. No alcohol or driving on this med. Stop if lethargic or hallucinating. 90 tablet 3    pravastatin 20 MG Oral Tab Take 1 tablet (20 mg total) by mouth nightly. (Patient not taking: Reported on 2/7/2025) 90 tablet 3    EPINEPHrine 0.3 MG/0.3ML Injection Solution Auto-injector FOR SUSPECTED ANAPHYLAXIS, ADMINISTER A SINGLE AUTO INJECTOR AT A 90-DEGREE ANGLE INTO THE FRONT OR OUTER SIDE OF THE THIGH AND HOLD FOR 3 SECONDS. SEEK EMERGENCY MEDICAL CARE. MAY REPEAT AFTER 5 MINUTES IF THERE IS NO CHANGE IN STATUS OR IF THE SYMPTOMS RETURN.      amLODIPine 2.5 MG Oral Tab Take 0.5 tablets (1.25 mg total) by mouth daily.      Cyanocobalamin (B-12 OR) Take by mouth.      Ascorbic Acid (VITAMIN C) 1000 MG Oral Tab Take 1 tablet (1,000 mg total) by mouth daily.      Cholecalciferol 25 MCG (1000 UT) Oral Tab Take 1 tablet (1,000 Units total) by mouth daily.      Fluticasone Propionate 50 MCG/ACT Nasal Suspension 2 sprays  daily.      Coenzyme Q10 (CO Q 10) 10 MG Oral Cap Take 1 tablet by mouth.      Misc Natural Products (OSTEO BI-FLEX ADV DOUBLE ST) Oral Tab Take 1 tablet by mouth.      Multiple Vitamins-Minerals (MULTI-VITAMIN/MINERALS) Oral Tab Take 1 tablet by mouth daily.     [6]   Allergies  Allergen Reactions    Fluoxetine DIARRHEA     Had vomiting and diarrhea

## 2025-05-05 ENCOUNTER — OFFICE VISIT (OUTPATIENT)
Dept: ENDOCRINOLOGY CLINIC | Facility: CLINIC | Age: 85
End: 2025-05-05
Payer: COMMERCIAL

## 2025-05-05 VITALS
HEART RATE: 66 BPM | HEIGHT: 60 IN | DIASTOLIC BLOOD PRESSURE: 67 MMHG | BODY MASS INDEX: 19.24 KG/M2 | SYSTOLIC BLOOD PRESSURE: 138 MMHG | WEIGHT: 98 LBS

## 2025-05-05 DIAGNOSIS — E04.1 THYROID NODULE: ICD-10-CM

## 2025-05-05 DIAGNOSIS — M81.0 AGE-RELATED OSTEOPOROSIS WITHOUT CURRENT PATHOLOGICAL FRACTURE: Primary | ICD-10-CM

## 2025-05-05 DIAGNOSIS — E07.9 THYROID DISEASE: ICD-10-CM

## 2025-05-05 PROCEDURE — 3075F SYST BP GE 130 - 139MM HG: CPT | Performed by: INTERNAL MEDICINE

## 2025-05-05 PROCEDURE — 3008F BODY MASS INDEX DOCD: CPT | Performed by: INTERNAL MEDICINE

## 2025-05-05 PROCEDURE — G2211 COMPLEX E/M VISIT ADD ON: HCPCS | Performed by: INTERNAL MEDICINE

## 2025-05-05 PROCEDURE — 99205 OFFICE O/P NEW HI 60 MIN: CPT | Performed by: INTERNAL MEDICINE

## 2025-05-05 PROCEDURE — 3078F DIAST BP <80 MM HG: CPT | Performed by: INTERNAL MEDICINE

## 2025-05-05 PROCEDURE — 1160F RVW MEDS BY RX/DR IN RCRD: CPT | Performed by: INTERNAL MEDICINE

## 2025-05-05 PROCEDURE — 1159F MED LIST DOCD IN RCRD: CPT | Performed by: INTERNAL MEDICINE

## 2025-05-05 NOTE — PATIENT INSTRUCTIONS
Latest Reference Range & Units 11/18/24 10:12   TSH 0.550 - 4.780 uIU/mL 2.848        12/02/24 14:19   US THYROID (CPT=76536) Rpt        Labs today   US thyroid in Dec 2025 to monitor thyroid nodule     Osteoporosis on DXA in 3/21/2025   We need to start treatment   Dental clearance before starting treatment     Take vitamin D [id number] international unit a day and calcium 600 mg twice a day or 3 servings of dairy a day   Do weight bearing exercise   Follow fall precautions   educational material will be attached to plan  We discussed diagnosis, treatment options, fall risk, long term complications and side effect.   Discussed options oral bisphosphonate vs infusion bisphosphonate vs Denosumab vs evenity vs forteo    Side effect from osteoporosis meds   Hypocalcemia: Adequately supplement calcium and vitamin D    Osteonecrosis of the Jaw: Monitor for symptoms.   Atypical Femoral Fracture: Evaluate new or unusual thigh, hip, or groin pain    Coffee and osteoporosis   Osteoporosis -- Data suggest that high coffee intake may be associated with lower bone mineral density and increased fracture risk in females, particularly those with low calcium intake.  Several longitudinal studies have found an inverse correlation between caffeine intake and bone density for at least some females . One cohort study in older females (70 to 73 years) found that consumption of five or more cups of coffee per day was associated with decreased bone mineral density only among lean females [109]. Another study found no effect of caffeine on bone density for females whose calcium intake was at least 800 mg daily, but there was an inverse correlation for females with lower calcium intake [108]. Similar to results in bone mineral density, studies examining osteoporotic fractures demonstrate no to modestly increased risk of fracture with high caffeine intake [110-113]. (See \"Osteoporotic fracture risk assessment\".)  Tea consumption, on the  other hand, was associated with higher bone density in several studies ], although this increased bone density did not decrease fracture risk. It is postulated that higher flavonoids in tea, compared with coffee, may be responsible for the preservation of bone mineral.

## 2025-05-05 NOTE — PROGRESS NOTES
New Patient Evaluation - History and Physical    CONSULT - Reason for Visit:    osteoporosis and thyroid nodule  Requesting Physician:   .Cici Mason DO  No referring provider defined for this encounter.    CHIEF COMPLAINT:    Chief Complaint   Patient presents with    Thyroid Problem      Last completed office visit: Visit date not found   Next scheduled Follow up:   Future Appointments   Date Time Provider Department Center   5/28/2025 11:15 AM CARSON, PROCEDURE [id number] None   6/11/2025 12:30 PM Breana Mason DO [facility]   6/18/2025  8:30 AM [facility] MRI RM1 (1.5T WIDE) [facility] MRI EM [place]   8/4/2025 11:30 AM [facility]      HISTORY OF PRESENT ILLNESS:   Alysia Downing is a 85 year old female who presents with   osteoporosis and thyroid nodule    #Thyroid nodule   She c/o Fatigue more in the last few yrs   She had US thyroid 12/2024 showed Unchanged 1.1 cm right lower thyroid nodule, since 2021   Labs showed normal TSH        Compression symptoms: denied except the underlined ones SOB, dysphagia, odynophagia, change in voice, hoarseness, neck pain or neck mass.     The patient endorses the bolded symptoms: intolerance to cold, constipation, decreased lacrimation, fatigue; anxiety, heat intolerance, insomnia, tremors, wt loss, wt gain, irregular menses, lid lag, palpitations, proptosis, thinning hair; dyspnea, difficulty breathing when lying down, dysphagia, sensation of food getting stuck in the throat, choking sensation when lying flat, pooling of saliva, dysphonia, voice changes, hoarseness; none.     Neck surgery: No  Neck radiation: No   Biotin: no  Turmeric:no  Family history of thyroid cancer in 1st degree relatives: no        HYS when was 38      Osteoporosis on DXA  Fragility fracture: no   Height loss: 2 inches   Denied risk factors: steroid use, alcohol abuse, liver disease, kidney disease, hyperthyroid, seizure medications.   Family history of  osteoporosis or fragility fracture: yes     Patient is on fall precaution.  Patient is taking Vitamin D and calcium.   Educated the patient to do wt-bearing exercise, but avoid falls.     ASSESSMENT AND PLAN:  Alysia Downing is a 85 year old female who presents with   osteoporosis and thyroid nodule    Thyroid nodule: no compressive sx. Normal TSH. No need for FNA.  Denied risk factors for thyroid cancer. Will monitor with US In 1 year  #osteoporosis, with Ht loss, +ve family history for osteoporosis. Normal vit D levels     Plan         Labs today   US thyroid in Dec 2025 to monitor thyroid nodule     Osteoporosis on DXA in 3/21/2025   We need to start treatment  we discussed and she prefers Reclast   Dental clearance before starting treatment     Take vitamin D [id number] international unit a day and calcium 600 mg twice a day or 3 servings of dairy a day   Do weight bearing exercise   Follow fall precautions   educational material will be attached to plan  We discussed diagnosis, treatment options, fall risk, long term complications and side effect.   Discussed options oral bisphosphonate vs infusion bisphosphonate vs Denosumab vs evenity vs forteo    Side effect from osteoporosis meds   Hypocalcemia: Adequately supplement calcium and vitamin D    Osteonecrosis of the Jaw: Monitor for symptoms.   Atypical Femoral Fracture: Evaluate new or unusual thigh, hip, or groin pain    Coffee and osteoporosis   Osteoporosis -- Data suggest that high coffee intake may be associated with lower bone mineral density and increased fracture risk in females, particularly those with low calcium intake.  Several longitudinal studies have found an inverse correlation between caffeine intake and bone density for at least some females . One cohort study in older females (70 to 73 years) found that consumption of five or more cups of coffee per day was associated with decreased bone mineral density only among lean females [109]. Another  study found no effect of caffeine on bone density for females whose calcium intake was at least 800 mg daily, but there was an inverse correlation for females with lower calcium intake [108]. Similar to results in bone mineral density, studies examining osteoporotic fractures demonstrate no to modestly increased risk of fracture with high caffeine intake [110-113]. (See \"Osteoporotic fracture risk assessment\".)  Tea consumption, on the other hand, was associated with higher bone density in several studies ], although this increased bone density did not decrease fracture risk. It is postulated that higher flavonoids in tea, compared with coffee, may be responsible for the preservation of bone mineral.        PAST MEDICAL HISTORY:   Past Medical History:    Basal cell carcinoma    left thigh    Basal cell carcinoma    Left eye    Blepharitis of both eyes    Corneal arcus senilis    Corneal Arcus, OU    Environmental allergies    High blood pressure    High cholesterol    HLD (hyperlipidemia)    Hypermetropia    OU    Hyperopia with astigmatism and presbyopia    OU    Intradermal nevus    right knee    Lentiginous junctional nevus of right thigh    right distal thigh    Miscarriage (HCC)    Per [employer]:  \"two miscarriages.\"    Multinodular goiter    Osteoporosis    Raynaud's disease    Seizure disorder (HCC)    Subjective visual disturbance of both eyes    Vitreous floaters of left eye       PAST SURGICAL HISTORY:   Past Surgical History:   Procedure Laterality Date    Cataract Bilateral 2021    Surgery    Colonoscopy      D & c      Hysterectomy      age 38          Skin surgery         CURRENT MEDICATIONS:     amLODIPine 2.5 MG Oral Tab Take 0.5 tablets (1.25 mg total) by mouth daily.         ALLERGIES:  Allergies   Allergen Reactions    Fluoxetine DIARRHEA     Had vomiting and diarrhea       SOCIAL HISTORY:    Social History     Socioeconomic History    Marital status:    Tobacco Use    Smoking status:  Former     Current packs/day: 0.00     Types: Cigarettes     Quit date: 1985     Years since quittin.3    Smokeless tobacco: Never    Tobacco comments:     Only socially in the early 80s only   Vaping Use    Vaping status: Never Used   Substance and Sexual Activity    Alcohol use: Yes     Alcohol/week: 7.0 standard drinks of alcohol     Types: 7 Glasses of wine per week     Comment: 7 drinks weekly    Drug use: No   Other Topics Concern    Pt has a pacemaker No    Pt has a defibrillator No    Reaction to local anesthetic No    Caffeine Concern Yes     Comment: Tea, 1-3 cups daily      Retired      FAMILY HISTORY:   Family History   Problem Relation Age of Onset    Dementia Mother     Kidney Disease Father     Other (brain aneurysm) Father     Brain Cancer Brother     Other (brain hemorrhage) Maternal Grandmother     No Known Problems Paternal Grandmother     No Known Problems Paternal Grandfather     Breast Cancer Daughter 49    Cancer Son         Melanoma    Other (Other) Other         Family h/o No Retina History    No Known Problems Sister     Diabetes Neg     Glaucoma Neg     Macular degeneration Neg         REVIEW OF SYSTEMS:  All negative other than HPI    PHYSICAL EXAM:   Height: 5' (152.4 cm) (1513)  Weight: 98 lb (44.5 kg) (1513)  BSA (Calculated - sq m): 1.38 sq meters (1513)  Pulse: 66 (1513)  BP: 138/67 (1513)  Temp: --  Do Not Use - Resp Rate: --  SpO2: --    Body mass index is 19.14 kg/m².  No nodule   No spine tenderness   CONSTITUTIONAL:  Awake and alert. Age appropriate, good hygiene not in acute distress. Well-nourished and well developed. no acute distress   PSYCH:    Normal mood and affect,   cooperative  Neuro: speech is clear. Awake, alert, no aphasia, no facial asymmetry,    EYES:  No proptosis, no ptosis, conjunctiva normal  ENT:  Normocephalic, atraumatic  Eye: EOMI, normal lids, no discharge,    No rhinorrhea, moist oral mucosa  Neck: full  range of motion  Neck/Thyroid: neck inspection: normal, No scar, No goiter   LUNGS:  No acute respiratory distress, non-labored respiration. Speaking full sentences  CARDIOVASCULAR:  regular rate   ABDOMEN:  No abdominal pain.   SKIN:  Skin is dry, no obvious rashes or lesions  EXTREMITIES: no gross abnormality   MSK: Moves extremities spontaneously.       DATA:     Pertinent data reviewed   Latest Reference Range & Units 11/18/24 10:12   TSH 0.550 - 4.780 uIU/mL 2.848        12/02/24 14:19   US THYROID (PHE=87009) Rpt     [facility] RERE 2D+3D SCREENING BILAT (CPT=77067/19102)  Result Date: 3/25/2025  CONCLUSION:   There is no mammographic evidence of malignancy in either breast. As long as patient's clinical breast exam remains unchanged, annual screening mammogram is recommended.  BI-RADS CATEGORY:   DIAGNOSTIC CATEGORY 1 - NEGATIVE ASSESSMENT.   RECOMMENDATIONS:  ROUTINE MAMMOGRAM AND CLINICAL EVALUATION IN 12 MONTHS.       PLEASE NOTE: NORMAL MAMMOGRAM DOES NOT EXCLUDE THE POSSIBILITY OF BREAST CANCER.  A CLINICALLY SUSPICIOUS PALPABLE LUMP SHOULD BE BIOPSIED.   For patients over the age of 40, the target due date for the patient's next mammogram has been entered into a reminder system.   Patient received a discharge summary from the technologist after completion of exam.  Breast marker legend used on images  Triangle = Palpable lump [place] = Skin tag or mole BB = Nipple Linear daniel = Scar Square = Pain    Dictated by (CST): Lul Sharma DO on 3/25/2025 at 4:54 PM     Finalized by (CST): Lul Sharma DO on 3/25/2025 at 4:55 PM      [address] [place] [address] [phone]    XR DEXA BONE DENSITOMETRY (CPT=77080)  Result Date: 3/21/2025  CONCLUSION:  1. Osteoporosis, which according to World Health Organization criteria places the patient at a severely increased risk for fracture.  2. Based on left femoral neck bone mineral density, the FRAX 10 year probability  of a major osteoporotic fracture is 27% and the 10 year probability of a hip fracture is 11%.  3. Compared to the prior study, bone mineral density has increased in the lumbar spine and decreased in the left hip.     Dictated by (CST): Ricky Etienne MD on 3/21/2025 at 1:43 PM     Finalized by (CST): Ricky Etienne MD on 3/21/2025 at 1:44 PM      [address] [place] [address] [phone]      No results for input(s): \"TSH\", \"T4F\", \"T3F\", \"THYP\" in the last 72 hours.  TSH   Date Value Ref Range Status   11/18/2024 2.848 0.550 - 4.780 uIU/mL Final     Lab Results   Component Value Date    A1C 5.7 (H) 11/18/2024           No results for input(s): \"TSH\", \"T4F\", \"T3F\", \"THYP\" in the last 72 hours.  [facility] RERE 2D+3D SCREENING BILAT (CPT=77067/74564)  Result Date: 3/25/2025  CONCLUSION:   There is no mammographic evidence of malignancy in either breast. As long as patient's clinical breast exam remains unchanged, annual screening mammogram is recommended.  BI-RADS CATEGORY:   DIAGNOSTIC CATEGORY 1 - NEGATIVE ASSESSMENT.   RECOMMENDATIONS:  ROUTINE MAMMOGRAM AND CLINICAL EVALUATION IN 12 MONTHS.       PLEASE NOTE: NORMAL MAMMOGRAM DOES NOT EXCLUDE THE POSSIBILITY OF BREAST CANCER.  A CLINICALLY SUSPICIOUS PALPABLE LUMP SHOULD BE BIOPSIED.   For patients over the age of 40, the target due date for the patient's next mammogram has been entered into a reminder system.   Patient received a discharge summary from the technologist after completion of exam.  Breast marker legend used on images  Triangle = Palpable lump [place] = Skin tag or mole BB = Nipple Linear daniel = Scar Square = Pain    Dictated by (CST): Lul Sharma DO on 3/25/2025 at 4:54 PM     Finalized by (CST): Lul Sharma DO on 3/25/2025 at 4:55 PM      [address], [place] [address] [phone]    XR DEXA BONE DENSITOMETRY (CPT=77080)  Result Date:  3/21/2025  CONCLUSION:  1. Osteoporosis, which according to World Health Organization criteria places the patient at a severely increased risk for fracture.  2. Based on left femoral neck bone mineral density, the FRAX 10 year probability of a major osteoporotic fracture is 27% and the 10 year probability of a hip fracture is 11%.  3. Compared to the prior study, bone mineral density has increased in the lumbar spine and decreased in the left hip.     Dictated by (CST): Ricky Etienne MD on 3/21/2025 at 1:43 PM     Finalized by (CST): Ricky Etienne MD on 3/21/2025 at 1:44 PM      [address] [place] [address] [phone]      Orders Placed This Encounter   Procedures    Comp Metabolic Panel [E]    TSH W Reflex To Free T4     Orders Placed This Encounter    Comp Metabolic Panel [E]     Standing Status:   Future     Expected Date:   5/5/2025     Expiration Date:   5/5/2026     Release to patient:   Immediate    TSH W Reflex To Free T4     Standing Status:   Future     Expected Date:   5/5/2025     Expiration Date:   5/5/2026     Release to patient:   Immediate          This is a specialized patient consultation in endocrinology and required comprehensive review of prior records, as well as current evaluation, with time required for consideration of complex endocrine issues and consultation. For this visit, I personally interviewed the patient, and family member if accompanied, performed the pertinent parts of the history and physical examination. ROS included screening for appropriate endocrine conditions.   Today's diagnosis and plan were reviewed in detail with the patient who states understanding and agrees with plan. I discussed with the patient possible diagnosis, differential diagnosis, need for work up, treatment options, alternatives and side effects.     Please see note for details about time spent which includes:   · pre-visit preparation  · reviewing  records  · face to face time with the patient   · timely documentation of the encounter  · ordering medications/tests  · communication with care team  · care coordination    I appreciate the opportunity to be part of your patient's medical care and will keep you, as the referring and primary physicians, informed about the care of your patient. Please feel free to contact me should you have any questions.    The 21st Century Cures Act makes medical notes like these available to patients in the interest of transparency. Please be advised this is a medical document. Medical documents are intended to carry relevant information, facts as evident, and the clinical opinion of the practitioner. The medical note is intended as peer to peer communication and may appear blunt or direct. It is written in medical language and may contain abbreviations or verbiage that are unfamiliar.   Shirley Bobby MD

## 2025-05-19 ENCOUNTER — TELEPHONE (OUTPATIENT)
Facility: CLINIC | Age: 85
End: 2025-05-19

## 2025-05-19 NOTE — TELEPHONE ENCOUNTER
Appointments for this Order  MRI MRCP (W+WO) (CPT=74183) (Order #[id number]) on 4/16/25     Date/Time Provider Department   6/18/25 8:30 AM  - 60 min [facility] MRI RM1 (1.5T WIDE) (Resource) [facility] Mri

## 2025-05-28 PROBLEM — R10.9 ABDOMINAL PAIN: Status: ACTIVE | Noted: 2025-05-28

## 2025-06-04 ENCOUNTER — OFFICE VISIT (OUTPATIENT)
Dept: INTERNAL MEDICINE CLINIC | Facility: CLINIC | Age: 85
End: 2025-06-04

## 2025-06-04 VITALS
DIASTOLIC BLOOD PRESSURE: 60 MMHG | WEIGHT: 96.25 LBS | HEART RATE: 80 BPM | SYSTOLIC BLOOD PRESSURE: 110 MMHG | OXYGEN SATURATION: 100 % | BODY MASS INDEX: 18.9 KG/M2 | HEIGHT: 60 IN | TEMPERATURE: 97 F

## 2025-06-04 DIAGNOSIS — R41.3 MEMORY LOSS: ICD-10-CM

## 2025-06-04 DIAGNOSIS — Z12.31 ENCOUNTER FOR SCREENING MAMMOGRAM FOR MALIGNANT NEOPLASM OF BREAST: Primary | ICD-10-CM

## 2025-06-04 PROCEDURE — 3074F SYST BP LT 130 MM HG: CPT | Performed by: INTERNAL MEDICINE

## 2025-06-04 PROCEDURE — 3008F BODY MASS INDEX DOCD: CPT | Performed by: INTERNAL MEDICINE

## 2025-06-04 PROCEDURE — 1160F RVW MEDS BY RX/DR IN RCRD: CPT | Performed by: INTERNAL MEDICINE

## 2025-06-04 PROCEDURE — 3078F DIAST BP <80 MM HG: CPT | Performed by: INTERNAL MEDICINE

## 2025-06-04 PROCEDURE — 1126F AMNT PAIN NOTED NONE PRSNT: CPT | Performed by: INTERNAL MEDICINE

## 2025-06-04 PROCEDURE — 1159F MED LIST DOCD IN RCRD: CPT | Performed by: INTERNAL MEDICINE

## 2025-06-04 PROCEDURE — 99214 OFFICE O/P EST MOD 30 MIN: CPT | Performed by: INTERNAL MEDICINE

## 2025-06-04 NOTE — PROGRESS NOTES
Alysia Downing is a 85 year old female.  Chief Complaint   Patient presents with    Follow - Up     Pt in for 6 month f/u- pt states having abd issues. Also having some memory loss.     HPI:   Alysia Downing is a 85 year old female who presents for a check up.    LOV 1/13/25.    She is still tap and line dancing.    She is worried about her memory - took a wrong turn when driving near her house, forgot where she put her 1099 tax forms, and forgets things such as the name of the movie she watched the night prior. No one else in her family is concerned including her  who she lives with. She is concerned because her mother had similar symptoms at the same age.    Wt Readings from Last 6 Encounters:   06/04/25 96 lb 4 oz (43.7 kg)   05/28/25 90 lb (40.8 kg)   05/05/25 98 lb (44.5 kg)   04/16/25 96 lb 9.6 oz (43.8 kg)   01/30/25 96 lb (43.5 kg)   01/13/25 95 lb 9.6 oz (43.4 kg)     Body mass index is 18.8 kg/m².     Current Medications[1]   Past Medical History[2]   Past Surgical History[3]   Family History[4]   Social History:   Short Social Hx on File[5]       REVIEW OF SYSTEMS:   GENERAL: feels well otherwise  NEURO: + memory loss    EXAM:   /60 (BP Location: Right arm, Patient Position: Sitting, Cuff Size: adult)   Pulse 80   Temp 96.6 °F (35.9 °C) (Temporal)   Ht 5' (1.524 m)   Wt 96 lb 4 oz (43.7 kg)   SpO2 100%   BMI 18.80 kg/m²     GENERAL: well developed, well nourished, in no apparent distress  NEURO: A&O x 3, moves all 4 extremities spontaneously    ASSESSMENT AND PLAN:   Alysia Downing is a 85 year old female who presents for a check up.    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 Denny  - US 12/2/24 stable, repeat scheduled for 11/2025 - ordered by nikole     Insomnia  - melatonin doesn't help  - rx prozac, trazodone by previous PCP, never taken     Anxiety  - alprazolam 0.5 mg (takes 1/4-1/2) daily PRN (takes sporadically), asking for refill  - declines SSRI given hx of seizure in the 1970s with this     Hx of BCC  - sees derm Dr. Ramirez    Osteoporosis  - seen by endo, reclast recommended     Healthcare Maintenance  Cancer Screenings:  - Breast: birads 1 3/21/25, repeat ordered today  - Cervical: age >65  - Colon: colonoscopy 5/28/25 w/Dr. Blair: mild sigmoid diverticulosis, small non-bleeding hemorrhoids  - Lung: quit >20 years ago     Vaccines:  - Shingles: shingrix x2  - Pneumonia: prevnar 20 UTD per patient report  - Flu: recommend yearly  - COVID-19: x3 UTD     Misc:  - Calcium Score 12/9/21: 36  - DEXA: 3/21/25 w/osteoporosis     For E/M code - 30 minutes spent reviewing performing chart review, obtaining a history, performing a physical exam, reviewing the assessment/plan, placing orders, and completing documentation.     Breana Mason DO  6/4/2025  10:42 AM         [1]   Current Outpatient Medications   Medication Sig Dispense Refill    Probiotic Product (PROBIOTIC DAILY OR) Take by mouth.      MAGNESIUM GLYCINATE OR Take by mouth.      Calcium-Magnesium-Vitamin D (CALCIUM 1200+D3 OR) Take by mouth.      amLODIPine 2.5 MG Oral Tab Take 1.25 mg by mouth in the morning.      Cyanocobalamin (B-12 OR) Take by mouth.      Ascorbic Acid (VITAMIN C) 1000 MG Oral Tab Take 1,000 mg by mouth in the morning.      Cholecalciferol 25 MCG (1000 UT) Oral Tab Take 1,000 Units by mouth in the morning.      Coenzyme Q10 (CO Q 10) 10 MG Oral Cap Take 1 tablet by mouth.      Misc Natural Products (OSTEO BI-FLEX ADV DOUBLE ST) Oral Tab Take 1 tablet by mouth.      Multiple  Vitamins-Minerals (MULTI-VITAMIN/MINERALS) Oral Tab Take 1 tablet by mouth in the morning.      Na Sulfate-K Sulfate-Mg Sulf (SUPREP BOWEL PREP KIT) 17.5-3.13-1.6 GM/177ML Oral Solution Take as directed 1 kit 0    dicyclomine 10 MG Oral Cap Take 1 capsule (10 mg total) by mouth 3 (three) times daily as needed. (Patient not taking: Reported on 4/16/2025) 90 capsule 2    ALPRAZolam 0.5 MG Oral Tab Take 1 tablet (0.5 mg total) by mouth daily as needed for Anxiety. No alcohol or driving on this med. Stop if lethargic or hallucinating. 90 tablet 3    EPINEPHrine 0.3 MG/0.3ML Injection Solution Auto-injector FOR SUSPECTED ANAPHYLAXIS, ADMINISTER A SINGLE AUTO INJECTOR AT A 90-DEGREE ANGLE INTO THE FRONT OR OUTER SIDE OF THE THIGH AND HOLD FOR 3 SECONDS. SEEK EMERGENCY MEDICAL CARE. MAY REPEAT AFTER 5 MINUTES IF THERE IS NO CHANGE IN STATUS OR IF THE SYMPTOMS RETURN. (Patient not taking: Reported on 5/28/2025)      Fluticasone Propionate 50 MCG/ACT Nasal Suspension 2 sprays daily. (Patient not taking: Reported on 4/16/2025)     [2]   Past Medical History:   Anxiety state    Basal cell carcinoma    left thigh    Basal cell carcinoma    Left eye    Blepharitis of both eyes    Corneal arcus senilis    Corneal Arcus, OU    Environmental allergies    High blood pressure    High cholesterol    HLD (hyperlipidemia)    Hypermetropia    OU    Hyperopia with astigmatism and presbyopia    OU    Intradermal nevus    right knee    Lentiginous junctional nevus of right thigh    right distal thigh    Miscarriage (HCC)    Per [employer]:  \"two miscarriages.\"    Multinodular goiter    Osteoporosis    Raynaud's disease    Seizure disorder (HCC)    Subjective visual disturbance of both eyes    Vitreous floaters of left eye   [3]   Past Surgical History:  Procedure Laterality Date    Cataract Bilateral 11/2021    Surgery    Colonoscopy      Colonoscopy N/A 5/28/2025    Procedure: COLONOSCOPY;  Surgeon: Jake Blair MD;  Location: [facility]  MAIN OR    D & c      Hysterectomy      age 38          Skin surgery     [4]   Family History  Problem Relation Age of Onset    Dementia Mother     Kidney Disease Father     Other (brain aneurysm) Father     Brain Cancer Brother     Other (brain hemorrhage) Maternal Grandmother     No Known Problems Paternal Grandmother     No Known Problems Paternal Grandfather     Breast Cancer Daughter 49    Cancer Son         Melanoma    Other (Other) Other         Family h/o No Retina History    No Known Problems Sister     Diabetes Neg     Glaucoma Neg     Macular degeneration Neg    [5]   Social History  Socioeconomic History    Marital status:    Tobacco Use    Smoking status: Former     Current packs/day: 0.00     Types: Cigarettes     Quit date: 1985     Years since quittin.4    Smokeless tobacco: Never    Tobacco comments:     Only socially in the early 80s only   Vaping Use    Vaping status: Never Used   Substance and Sexual Activity    Alcohol use: Yes     Alcohol/week: 7.0 standard drinks of alcohol     Types: 7 Glasses of wine per week     Comment: 7 drinks weekly    Drug use: No   Other Topics Concern    Pt has a pacemaker No    Pt has a defibrillator No    Reaction to local anesthetic No    Caffeine Concern Yes     Comment: Tea, 1-3 cups daily     Social Drivers of Health     Food Insecurity: No Food Insecurity (1/10/2024)    Received from [facility]    Hunger Vital Sign     Worried About Running Out of Food in the Last Year: Never true     Ran Out of Food in the Last Year: Never true   Transportation Needs: No Transportation Needs (1/10/2024)    Received from [facility]    PRAPARE - Transportation     Lack of Transportation (Medical): No     Lack of Transportation (Non-Medical): No   Housing Stability: Unknown (1/10/2024)    Received from [facility]    Housing Stability Vital Sign     Unable to Pay for Housing in the Last Year: No      Unstable Housing in the Last Year: No

## 2025-06-18 ENCOUNTER — HOSPITAL ENCOUNTER (OUTPATIENT)
Dept: MRI IMAGING | Facility: HOSPITAL | Age: 85
Discharge: HOME | End: 2025-06-18
Attending: INTERNAL MEDICINE
Payer: MEDICARE

## 2025-06-18 DIAGNOSIS — R10.9 ABDOMINAL DISCOMFORT: ICD-10-CM

## 2025-06-18 DIAGNOSIS — R93.3 ABNORMAL MAGNETIC RESONANCE CHOLANGIOPANCREATOGRAPHY (MRCP): ICD-10-CM

## 2025-06-18 DIAGNOSIS — R93.5 ABNORMAL CT OF THE ABDOMEN: ICD-10-CM

## 2025-06-18 DIAGNOSIS — K86.89 DILATED PANCREATIC DUCT (HCC): ICD-10-CM

## 2025-06-18 PROCEDURE — A9575 INJ GADOTERATE MEGLUMI 0.1ML: HCPCS | Performed by: INTERNAL MEDICINE

## 2025-06-18 PROCEDURE — 74183 MRI ABD W/O CNTR FLWD CNTR: CPT | Performed by: INTERNAL MEDICINE

## 2025-06-18 RX ORDER — DIPHENHYDRAMINE HYDROCHLORIDE 50 MG/ML
10 INJECTION, SOLUTION INTRAMUSCULAR; INTRAVENOUS
Status: COMPLETED | OUTPATIENT
Start: 2025-06-18 | End: 2025-06-18

## 2025-06-18 RX ADMIN — DIPHENHYDRAMINE HYDROCHLORIDE 9 ML: 50 INJECTION, SOLUTION INTRAMUSCULAR; INTRAVENOUS at 10:00:00

## 2025-06-23 ENCOUNTER — OFFICE VISIT (OUTPATIENT)
Dept: DERMATOLOGY CLINIC | Facility: CLINIC | Age: 85
End: 2025-06-23
Payer: COMMERCIAL

## 2025-06-23 DIAGNOSIS — L82.1 SK (SEBORRHEIC KERATOSIS): ICD-10-CM

## 2025-06-23 DIAGNOSIS — L57.0 AK (ACTINIC KERATOSIS): ICD-10-CM

## 2025-06-23 DIAGNOSIS — L30.9 DERMATITIS: Primary | ICD-10-CM

## 2025-06-23 DIAGNOSIS — D23.9 BENIGN NEOPLASM OF SKIN, UNSPECIFIED LOCATION: ICD-10-CM

## 2025-06-23 DIAGNOSIS — Z13.89 ENCOUNTER FOR SURVEILLANCE OF ABNORMAL NEVI: ICD-10-CM

## 2025-06-23 DIAGNOSIS — Z80.8 FAMILY HISTORY OF MELANOMA: ICD-10-CM

## 2025-06-23 DIAGNOSIS — L81.4 LENTIGO: ICD-10-CM

## 2025-06-23 DIAGNOSIS — D22.9 MULTIPLE NEVI: ICD-10-CM

## 2025-06-23 PROCEDURE — G2211 COMPLEX E/M VISIT ADD ON: HCPCS | Performed by: DERMATOLOGY

## 2025-06-23 PROCEDURE — 1160F RVW MEDS BY RX/DR IN RCRD: CPT | Performed by: DERMATOLOGY

## 2025-06-23 PROCEDURE — 99213 OFFICE O/P EST LOW 20 MIN: CPT | Performed by: DERMATOLOGY

## 2025-06-23 PROCEDURE — 1159F MED LIST DOCD IN RCRD: CPT | Performed by: DERMATOLOGY

## 2025-06-23 RX ORDER — TRIAMCINOLONE ACETONIDE 1 MG/G
1 CREAM TOPICAL 2 TIMES DAILY
Qty: 80 G | Refills: 2 | Status: SHIPPED | OUTPATIENT
Start: 2025-06-23

## 2025-06-23 NOTE — PROGRESS NOTES
The following individual(s) verbally consented to be recorded using ambient AI listening technology and understand that they can each withdraw their consent to this listening technology at any point by asking the clinician to turn off or pause the recording:    Patient name: Alysia Downing  Additional names:

## 2025-06-26 ENCOUNTER — OFFICE VISIT (OUTPATIENT)
Dept: OBGYN CLINIC | Facility: CLINIC | Age: 85
End: 2025-06-26
Payer: COMMERCIAL

## 2025-06-26 VITALS
DIASTOLIC BLOOD PRESSURE: 68 MMHG | WEIGHT: 95 LBS | SYSTOLIC BLOOD PRESSURE: 135 MMHG | HEART RATE: 71 BPM | BODY MASS INDEX: 19 KG/M2

## 2025-06-26 DIAGNOSIS — N83.201 RIGHT OVARIAN CYST: Primary | ICD-10-CM

## 2025-06-26 PROCEDURE — 1159F MED LIST DOCD IN RCRD: CPT | Performed by: OBSTETRICS & GYNECOLOGY

## 2025-06-26 PROCEDURE — 1160F RVW MEDS BY RX/DR IN RCRD: CPT | Performed by: OBSTETRICS & GYNECOLOGY

## 2025-06-26 PROCEDURE — 3075F SYST BP GE 130 - 139MM HG: CPT | Performed by: OBSTETRICS & GYNECOLOGY

## 2025-06-26 PROCEDURE — 3078F DIAST BP <80 MM HG: CPT | Performed by: OBSTETRICS & GYNECOLOGY

## 2025-06-26 PROCEDURE — 99203 OFFICE O/P NEW LOW 30 MIN: CPT | Performed by: OBSTETRICS & GYNECOLOGY

## 2025-06-26 NOTE — PROGRESS NOTES
Chief Complaint   Patient presents with    Consult     DISCUSS MRI         HPI:      History of Present Illness    86 yo F here to review MRI report  Having surveillance with Dr Blair for pancreatic cyst.  MRI done through [facility] in Oct 2024 shows 2.2cm right ovarian cyst.  MRI 6 days ago shows 2.1 x 2.6cm cyst.  NO family history of ovarian cancer.  Hysterectomy at 37 yo as patient states her GYN stated she shouldn't have any more children.      MRI MRCP (W+WO) (CPT=74183)  Result Date: 2025  CONCLUSION:  Motion-degraded study. Within these parameters: 1. Clustered cystic lesions in the pancreatic body may reflect initially papillary mucinous neoplasm or several sidebranch type IPMNs in immediate proximity. There are additional scattered cystic foci throughout the pancreatic body and these may reflect additional dilated sidebranch radicles or sidebranch type IPMNs. No suspicious enhancing elements are evident. Follow-up MRCP of the abdomen with and without contrast can be considered in 1 year for further assessment if warranted on clinical grounds.   2. The pancreaticobiliary tree is without identification of intraductal filling defects.  3. Indeterminate right adnexal cystic lesion. In a postmenopausal female, consider annual sonographic surveillance.  4. Lesser incidental findings as above.    Dictated by (CST): Brennan Jackson MD on 2025 at 3:37 PM     Finalized by (CST): Brennan Jackson MD on 2025 at 4:00 PM              Chaperone: declines      Depression Screening (PHQ-2/PHQ-9): Over the LAST 2 WEEKS   Little interest or pleasure in doing things (over the last two weeks)?: Not at all    Feeling down, depressed, or hopeless (over the last two weeks)?: Not at all    PHQ-2 SCORE: 0            Reviewed medical and surgical history below       OBSTETRICS HISTORY:  OB History    Para Term  AB Living   7 5 5 0 2 3   SAB IAB Ectopic Multiple Live Births   2 0 0 0 5       GYNE  HISTORY:  History   Sexual Activity    Sexual activity: Not on file            MEDICAL HISTORY:  Past Medical History:    Anxiety state    Basal cell carcinoma    left thigh    Basal cell carcinoma    Left eye    Blepharitis of both eyes    Corneal arcus senilis    Corneal Arcus, OU    Environmental allergies    High blood pressure    High cholesterol    HLD (hyperlipidemia)    Hypermetropia    OU    Hyperopia with astigmatism and presbyopia    OU    Intradermal nevus    right knee    Lentiginous junctional nevus of right thigh    right distal thigh    Miscarriage (HCC)    Per [facility]:  \"two miscarriages.\"    Multinodular goiter    Osteoporosis    Raynaud's disease    Seizure disorder (HCC)    Subjective visual disturbance of both eyes    Vitreous floaters of left eye       SURGICAL HISTORY:  Past Surgical History:   Procedure Laterality Date    Cataract Bilateral 2021    Surgery    Colonoscopy      Colonoscopy N/A 2025    Procedure: COLONOSCOPY;  Surgeon: Jake Blair MD;  Location: [facility] MAIN OR    D & c      Hysterectomy      age 38          Skin surgery         SOCIAL HISTORY:  Social History     Socioeconomic History    Marital status:      Spouse name: Not on file    Number of children: Not on file    Years of education: Not on file    Highest education level: Not on file   Occupational History    Not on file   Tobacco Use    Smoking status: Former     Current packs/day: 0.00     Types: Cigarettes     Quit date: 1985     Years since quittin.5    Smokeless tobacco: Never    Tobacco comments:     Only socially in the early 80s only   Vaping Use    Vaping status: Never Used   Substance and Sexual Activity    Alcohol use: Yes     Alcohol/week: 7.0 standard drinks of alcohol     Types: 7 Glasses of wine per week     Comment: 7 drinks weekly    Drug use: No    Sexual activity: Not on file   Other Topics Concern    Grew up on a farm Not Asked    History of tanning Yes    Outdoor  occupation Not Asked    Pt has a pacemaker No    Pt has a defibrillator No    Breast feeding Not Asked    Reaction to local anesthetic No     Service Not Asked    Blood Transfusions Not Asked    Caffeine Concern Yes     Comment: Tea, 1-3 cups daily    Occupational Exposure Not Asked    Hobby Hazards Not Asked    Sleep Concern Not Asked    Stress Concern Not Asked    Weight Concern Not Asked    Special Diet Not Asked    Back Care Not Asked    Exercise Not Asked    Bike Helmet Not Asked    Seat Belt Not Asked    Self-Exams Not Asked   Social History Narrative    Not on file     Social Drivers of Health     Food Insecurity: No Food Insecurity (1/10/2024)    Received from [facility]    Hunger Vital Sign     Worried About Running Out of Food in the Last Year: Never true     Ran Out of Food in the Last Year: Never true   Transportation Needs: No Transportation Needs (1/10/2024)    Received from [facility]    PRAPARE - Transportation     Lack of Transportation (Medical): No     Lack of Transportation (Non-Medical): No   Stress: Not on file   Housing Stability: Unknown (1/10/2024)    Received from [facility]    Housing Stability Vital Sign     Unable to Pay for Housing in the Last Year: No     Number of Places Lived in the Last Year: Not on file     Unstable Housing in the Last Year: No       FAMILY HISTORY:  Family History   Problem Relation Age of Onset    Dementia Mother     Kidney Disease Father     Other (brain aneurysm) Father     Brain Cancer Brother     Other (brain hemorrhage) Maternal Grandmother     No Known Problems Paternal Grandmother     No Known Problems Paternal Grandfather     Breast Cancer Daughter 49    Cancer Son         Melanoma    Other (Other) Other         Family h/o No Retina History    No Known Problems Sister     Diabetes Neg     Glaucoma Neg     Macular degeneration Neg        MEDICATIONS:    Current Outpatient  Medications:     triamcinolone 0.1 % External Cream, Apply 1 Application topically 2 (two) times daily. To rash as needed, Disp: 80 g, Rfl: 2    Probiotic Product (PROBIOTIC DAILY OR), Take by mouth., Disp: , Rfl:     MAGNESIUM GLYCINATE OR, Take by mouth., Disp: , Rfl:     Calcium-Magnesium-Vitamin D (CALCIUM 1200+D3 OR), Take by mouth., Disp: , Rfl:     ALPRAZolam 0.5 MG Oral Tab, Take 1 tablet (0.5 mg total) by mouth daily as needed for Anxiety. No alcohol or driving on this med. Stop if lethargic or hallucinating., Disp: 90 tablet, Rfl: 3    amLODIPine 2.5 MG Oral Tab, Take 1.25 mg by mouth in the morning., Disp: , Rfl:     Cyanocobalamin (B-12 OR), Take by mouth., Disp: , Rfl:     Ascorbic Acid (VITAMIN C) 1000 MG Oral Tab, Take 1,000 mg by mouth in the morning., Disp: , Rfl:     Cholecalciferol 25 MCG (1000 UT) Oral Tab, Take 1,000 Units by mouth in the morning., Disp: , Rfl:     Coenzyme Q10 (CO Q 10) 10 MG Oral Cap, Take 1 tablet by mouth., Disp: , Rfl:     Misc Natural Products (OSTEO BI-FLEX ADV DOUBLE ST) Oral Tab, Take 1 tablet by mouth., Disp: , Rfl:     Multiple Vitamins-Minerals (MULTI-VITAMIN/MINERALS) Oral Tab, Take 1 tablet by mouth in the morning., Disp: , Rfl:     Na Sulfate-K Sulfate-Mg Sulf (SUPREP BOWEL PREP KIT) 17.5-3.13-1.6 GM/177ML Oral Solution, Take as directed, Disp: 1 kit, Rfl: 0    dicyclomine 10 MG Oral Cap, Take 1 capsule (10 mg total) by mouth 3 (three) times daily as needed. (Patient not taking: Reported on 6/26/2025), Disp: 90 capsule, Rfl: 2    EPINEPHrine 0.3 MG/0.3ML Injection Solution Auto-injector, FOR SUSPECTED ANAPHYLAXIS, ADMINISTER A SINGLE AUTO INJECTOR AT A 90-DEGREE ANGLE INTO THE FRONT OR OUTER SIDE OF THE THIGH AND HOLD FOR 3 SECONDS. SEEK EMERGENCY MEDICAL CARE. MAY REPEAT AFTER 5 MINUTES IF THERE IS NO CHANGE IN STATUS OR IF THE SYMPTOMS RETURN. (Patient not taking: Reported on 6/26/2025), Disp: , Rfl:     Fluticasone Propionate 50 MCG/ACT Nasal Suspension, 2  sprays daily. (Patient not taking: Reported on 6/26/2025), Disp: , Rfl:     ALLERGIES:    Allergies   Allergen Reactions    Fluoxetine DIARRHEA     Had vomiting and diarrhea         Review of Systems:  Constitutional:  Denies fevers and chills   Cardiovascular:  denies chest pain or palpitations  Respiratory:  denies shortness of breath  Gastrointestinal:  denies heartburn, abdominal pain, diarrhea or constipation  Genitourinary:  denies dysuria, incontinence, abnormal vaginal discharge, vaginal itching  Musculoskeletal:  denies back pain.  Skin/Breast:  Denies any breast pain, lumps, or discharge.   Neurological:  denies headaches, extremity weakness  Psychiatric: denies depression or anxiety.      Vitals:    06/26/25 1306   BP: 135/68   Pulse: 71       PHYSICAL EXAM:   Constitutional: well developed, well nourished  Head/Face: normocephalic  Psychiatric: Appropriate mood and affect    Assessment/Plan:    Alysia was seen today for consult.    Diagnoses and all orders for this visit:    Right ovarian cyst  -     US PELVIS (TRANSABDOMINAL AND TRANSVAGINAL) (CPT=76856/35697); Future      MRI reports r/w patient.  Unchanged right ovarian cyst, likely benign cystadenoma given size and in PMF.  Pelvic US for baseline monitoring and likely plan for yearly US if able to see cyst, vs MRIs with surveillance of pancreatic cyst.  All questions answered.

## 2025-06-30 NOTE — PROGRESS NOTES
Alysia Downing is a 85 year old female.  HPI:     CC:    Chief Complaint   Patient presents with    Derm Problem     LOV 25-Pt presents for facial redness with tiny pimples on face. States her cheeks were blistery. There are also spots on B/L arms and abdomen. \"Googled that it was Rosacea\"  Has personal Hx of BCC(Left eye & left thigh). Family Hx of melanoma.         Allergies:  Fluoxetine    HISTORY:    Past Medical History:    Anxiety state    Basal cell carcinoma    left thigh    Basal cell carcinoma    Left eye    Blepharitis of both eyes    Corneal arcus senilis    Corneal Arcus, OU    Environmental allergies    High blood pressure    High cholesterol    HLD (hyperlipidemia)    Hypermetropia    OU    Hyperopia with astigmatism and presbyopia    OU    Intradermal nevus    right knee    Lentiginous junctional nevus of right thigh    right distal thigh    Miscarriage (HCC)    Per [employer]:  \"two miscarriages.\"    Multinodular goiter    Osteoporosis    Raynaud's disease    Seizure disorder (HCC)    Subjective visual disturbance of both eyes    Vitreous floaters of left eye      Past Surgical History:   Procedure Laterality Date    Cataract Bilateral 2021    Surgery    Colonoscopy      Colonoscopy N/A 2025    Procedure: COLONOSCOPY;  Surgeon: Jake Blair MD;  Location: [facility] MAIN OR    D & c      Hysterectomy      age 38          Skin surgery        Family History   Problem Relation Age of Onset    Dementia Mother     Kidney Disease Father     Other (brain aneurysm) Father     Brain Cancer Brother     Other (brain hemorrhage) Maternal Grandmother     No Known Problems Paternal Grandmother     No Known Problems Paternal Grandfather     Breast Cancer Daughter 49    Cancer Son         Melanoma    Other (Other) Other         Family h/o No Retina History    No Known Problems Sister     Diabetes Neg     Glaucoma Neg     Macular degeneration Neg       Social History     Socioeconomic History     VAT at bedside for additional piv request. Patient known to be a hard stick. Unit RN will stagger medication via PICC access for now. MD aware.    Marital status:    Tobacco Use    Smoking status: Former     Current packs/day: 0.00     Types: Cigarettes     Quit date: 1985     Years since quittin.5    Smokeless tobacco: Never    Tobacco comments:     Only socially in the early 80s only   Vaping Use    Vaping status: Never Used   Substance and Sexual Activity    Alcohol use: Yes     Alcohol/week: 7.0 standard drinks of alcohol     Types: 7 Glasses of wine per week     Comment: 7 drinks weekly    Drug use: No   Other Topics Concern    History of tanning Yes    Pt has a pacemaker No    Pt has a defibrillator No    Reaction to local anesthetic No    Caffeine Concern Yes     Comment: Tea, 1-3 cups daily     Social Drivers of Health     Food Insecurity: No Food Insecurity (1/10/2024)    Received from [facility]    Hunger Vital Sign     Worried About Running Out of Food in the Last Year: Never true     Ran Out of Food in the Last Year: Never true   Transportation Needs: No Transportation Needs (1/10/2024)    Received from [facility]    PRAPARE - Transportation     Lack of Transportation (Medical): No     Lack of Transportation (Non-Medical): No   Housing Stability: Unknown (1/10/2024)    Received from [facility]    Housing Stability Vital Sign     Unable to Pay for Housing in the Last Year: No     Unstable Housing in the Last Year: No        Current Outpatient Medications   Medication Sig Dispense Refill    triamcinolone 0.1 % External Cream Apply 1 Application topically 2 (two) times daily. To rash as needed 80 g 2    Probiotic Product (PROBIOTIC DAILY OR) Take by mouth.      MAGNESIUM GLYCINATE OR Take by mouth.      Calcium-Magnesium-Vitamin D (CALCIUM 1200+D3 OR) Take by mouth.      ALPRAZolam 0.5 MG Oral Tab Take 1 tablet (0.5 mg total) by mouth daily as needed for Anxiety. No alcohol or driving on this med. Stop if lethargic or hallucinating. 90 tablet 3    amLODIPine 2.5 MG Oral  Tab Take 1.25 mg by mouth in the morning.      Cyanocobalamin (B-12 OR) Take by mouth.      Ascorbic Acid (VITAMIN C) 1000 MG Oral Tab Take 1,000 mg by mouth in the morning.      Cholecalciferol 25 MCG (1000 UT) Oral Tab Take 1,000 Units by mouth in the morning.      Coenzyme Q10 (CO Q 10) 10 MG Oral Cap Take 1 tablet by mouth.      Misc Natural Products (OSTEO BI-FLEX ADV DOUBLE ST) Oral Tab Take 1 tablet by mouth.      Multiple Vitamins-Minerals (MULTI-VITAMIN/MINERALS) Oral Tab Take 1 tablet by mouth in the morning.      Na Sulfate-K Sulfate-Mg Sulf (SUPREP BOWEL PREP KIT) 17.5-3.13-1.6 GM/177ML Oral Solution Take as directed 1 kit 0    dicyclomine 10 MG Oral Cap Take 1 capsule (10 mg total) by mouth 3 (three) times daily as needed. (Patient not taking: Reported on 6/26/2025) 90 capsule 2    EPINEPHrine 0.3 MG/0.3ML Injection Solution Auto-injector FOR SUSPECTED ANAPHYLAXIS, ADMINISTER A SINGLE AUTO INJECTOR AT A 90-DEGREE ANGLE INTO THE FRONT OR OUTER SIDE OF THE THIGH AND HOLD FOR 3 SECONDS. SEEK EMERGENCY MEDICAL CARE. MAY REPEAT AFTER 5 MINUTES IF THERE IS NO CHANGE IN STATUS OR IF THE SYMPTOMS RETURN. (Patient not taking: Reported on 6/26/2025)      Fluticasone Propionate 50 MCG/ACT Nasal Suspension 2 sprays daily. (Patient not taking: Reported on 6/26/2025)       Allergies:   Allergies   Allergen Reactions    Fluoxetine DIARRHEA     Had vomiting and diarrhea       Past Medical History:    Anxiety state    Basal cell carcinoma    left thigh    Basal cell carcinoma    Left eye    Blepharitis of both eyes    Corneal arcus senilis    Corneal Arcus, OU    Environmental allergies    High blood pressure    High cholesterol    HLD (hyperlipidemia)    Hypermetropia    OU    Hyperopia with astigmatism and presbyopia    OU    Intradermal nevus    right knee    Lentiginous junctional nevus of right thigh    right distal thigh    Miscarriage (HCC)    Per [employer]:  \"two miscarriages.\"    Multinodular goiter     Osteoporosis    Raynaud's disease    Seizure disorder (HCC)    Subjective visual disturbance of both eyes    Vitreous floaters of left eye     Past Surgical History:   Procedure Laterality Date    Cataract Bilateral 2021    Surgery    Colonoscopy      Colonoscopy N/A 2025    Procedure: COLONOSCOPY;  Surgeon: Jake Blair MD;  Location: [facility] MAIN OR    D & c      Hysterectomy      age 38          Skin surgery       Social History     Socioeconomic History    Marital status:      Spouse name: Not on file    Number of children: Not on file    Years of education: Not on file    Highest education level: Not on file   Occupational History    Not on file   Tobacco Use    Smoking status: Former     Current packs/day: 0.00     Types: Cigarettes     Quit date: 1985     Years since quittin.5    Smokeless tobacco: Never    Tobacco comments:     Only socially in the early  only   Vaping Use    Vaping status: Never Used   Substance and Sexual Activity    Alcohol use: Yes     Alcohol/week: 7.0 standard drinks of alcohol     Types: 7 Glasses of wine per week     Comment: 7 drinks weekly    Drug use: No    Sexual activity: Not on file   Other Topics Concern    Grew up [place] a farm Not Asked    History of tanning Yes    Outdoor occupation Not Asked    Pt has a pacemaker No    Pt has a defibrillator No    Breast feeding Not Asked    Reaction to local anesthetic No     Service Not Asked    Blood Transfusions Not Asked    Caffeine Concern Yes     Comment: Tea, 1-3 cups daily    Occupational Exposure Not Asked    Hobby Hazards Not Asked    Sleep Concern Not Asked    Stress Concern Not Asked    Weight Concern Not Asked    Special Diet Not Asked    Back Care Not Asked    Exercise Not Asked    Bike Helmet Not Asked    Seat Belt Not Asked    Self-Exams Not Asked   Social History Narrative    Not on file     Social Drivers of Health     Food Insecurity: No Food Insecurity (1/10/2024)    Received from  [facility]    Hunger Vital Sign     Worried About Running Out of Food in the Last Year: Never true     Ran Out of Food in the Last Year: Never true   Transportation Needs: No Transportation Needs (1/10/2024)    Received from [facility]    PRAPARE - Transportation     Lack of Transportation (Medical): No     Lack of Transportation (Non-Medical): No   Stress: Not on file   Housing Stability: Unknown (1/10/2024)    Received from [facility]    Housing Stability Vital Sign     Unable to Pay for Housing in the Last Year: No     Number of Places Lived in the Last Year: Not on file     Unstable Housing in the Last Year: No     Family History   Problem Relation Age of Onset    Dementia Mother     Kidney Disease Father     Other (brain aneurysm) Father     Brain Cancer Brother     Other (brain hemorrhage) Maternal Grandmother     No Known Problems Paternal Grandmother     No Known Problems Paternal Grandfather     Breast Cancer Daughter 49    Cancer Son         Melanoma    Other (Other) Other         Family h/o No Retina History    No Known Problems Sister     Diabetes Neg     Glaucoma Neg     Macular degeneration Neg        There were no vitals filed for this visit.    HPI:    Chief Complaint   Patient presents with    Derm Problem     LOV 2/07/25-Pt presents for facial redness with tiny pimples on face. States her cheeks were blistery. There are also spots on B/L arms and abdomen. \"Googled that it was Rosacea\"  Has personal Hx of BCC(Left eye & left thigh). Family Hx of melanoma.     personal HX of BCC,BCC at forehead; son had melanoma and family HX of BCC and Melanoma    Flareup of poison ivy.  Had been at the [facility], spreading, extremely irritated no particular concerns.  Outdoors a great deal at her [facility]    Patient presents with concerns above.  History of Present Illness  Alysia Downing is an 84 year old female who presents with concerns about  skin changes and scalp lesions.    She has a scratchy lesion on her scalp, described as a large benign keratosis, which causes discomfort, especially when lying in the bathtub. The lesion is more noticeable due to thinning hair. 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 She believes these cysts are hereditary, as her grandmother had similar cysts.    Her hands are red and dry, particularly during the winter months. She uses hand soap and lotion for moisturizing. No itching is associated with the dryness. She reports dry skin on her feet, which she manages with lotion and wearing socks to bed.    She has multiple freckles and moles, with a persistent itch in one area that is red and scaly due to scratching. She attributes this itch to age-related irritated nerves. She has taken pictures of moles around her left eye, which have not changed over time. She has a history of benign lesions in that area. She notes the presence of age spots and skin tags under her bra line, which she describes as 'colorful'. No concerning changes in these areas.      Alysia Downing is an 85 year old female who presents with a rash on her cheeks, arms, legs, and stomach.    The rash began approximately two and a half weeks ago, characterized by 'little bumpy things' on her cheeks, arms, and legs, and pink-red dots on her stomach. It is itchy, and applying lotion causes a burning sensation. The rash has been improving over time without intervention.    She has digestive problems and recently underwent an MRI, which revealed an ovarian cyst. She was previously unaware of the cyst and is concerned about her digestive issues.    She experiences hair loss and anxiety. She is also worried about age spots and their potential to become cancerous, expressing concern about her skin's appearance compared to her grandchildren's.    [place] and [place] She is considering selling the property due to the maintenance burden. No recent exposure to poison ivy and the rash did not appear after the MRI.      Past notes/ records and appropriate/relevant lab results including pathology and past body maps reviewed. Updated and new information noted in current visit.     Patient has been in their usual state of health.  History, medications, allergies reviewed as noted.      ROS:  Denies any other systemic complaints.  No new or changeing lesions other than noted above. No fevers, chills, night sweats, unusual sun sensitivity.  No other skin complaints.   A12 point review of systems was completed.  Pertinent positives and negatives noted in the the HPI.     History, medications, allergies reviewed as noted.       Physical Examination:     Well-developed well-nourished patient alert oriented in no acute distress.  Exam total-body performed, including scalp, head, neck, face,nails, hair, external eyes, including conjunctival mucosa, eyelids, lips external ears, back, chest,/ breasts, axillae,  abdomen, arms, legs, palms.     Multiple light to medium brown, well marginated, uniformly pigmented, macules and papules 6 mm and less scattered on exam. pigmented lesions examined with dermoscopy benign-appearing patterns.     Waxy tannish keratotic papules scattered, cherry-red vascular papules scattered.    See map today's date for lesions noted .      Otherwise remarkable for lesions as noted on map.  See details of examination  See Assessment /Plan for additional history and physical exam also:    Assessment / plan:    No orders of the defined types were placed in this encounter.      Meds & Refills for this Visit:  Requested Prescriptions     Signed Prescriptions Disp Refills    triamcinolone 0.1 % External Cream 80 g 2     Sig: Apply 1 Application topically 2 (two) times daily. To rash as needed         Encounter  Diagnoses   Name Primary?    Dermatitis Yes    AK (actinic keratosis)     Multiple nevi     Benign neoplasm of skin, unspecified location     SK (seborrheic keratosis)     Lentigo     Encounter for surveillance of abnormal nevi     Family history of melanoma        See details on map.    Patient seen for follow-up long-term monitoring, treatment of  Actinic keratoses, history of skin cancers,  Plan of care:  ongoing surveillance, monitoring including regular follow-up due to longer term risk of recurrence, new lesions.  See previous notes.  There is a longitudinal care relationship with me, the care plan reflects the ongoing nature of the continuous relationship of care, and the medical record indicates that there is ongoing treatment of a serious/complex medical condition which I am currently managing.  is Applicable    Fall risk assessment:  Given the results of the fall risk questionnaire given today.   Suggest:   Make sure there is adequate lighting.  Eliminate throw rugs or possible sources of tripping & falling  Consider grab bars on the shower & toilet.  Hand rails on all stair cases  Ambulatory assistance devices such as cane or walker as indicate.  To ensure home safety measures.      Assessment & Plan  Benign Keratosis  Large benign keratosis on the scalp causing discomfort, especially when lying down. Noticeable due to thinning hair. Discussed excision under local anesthesia.  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 Discussed removal of all cysts under local anesthesia. Advised on post-procedure care to avoid serum collection and infection.  - Schedule excision of pilar cysts under local anesthesia  - Advise post-procedure care to avoid serum collection and infection    Dry Skin  Dry, red hands likely due to winter weather. No itching. Recommended moisturizers  with hyaluronic acid and urea.  - Recommend moisturizers with hyaluronic acid and urea    General Health Maintenance  Multiple freckles, moles, and age spots, none appearing cancerous. Importance of regular skin checks discussed.  - Schedule follow-up skin check in six months    Follow-up  - Schedule follow-up appointment in six months for skin check  - Schedule excision of keratosis and pilar cysts.    6/25  Eczema  Eczema with pruritic, erythematous macules on the stomach, cheeks, arms, and legs. Symptoms include itching and desquamation. The condition is self-resolving. Differential diagnosis included rosacea, but eczema is more likely. She reports previous burning sensation with lotion application, now resolving. Recent exposure to lake water may have contributed to the condition.  - Recommend over-the-counter hydrocortisone cream for topical application.  - Instruct to mix hydrocortisone cream with moisturizer and apply to affected areas.  - Advise that hydrocortisone cream is also effective for insect bites.              Otherwise exam is stable patient doing well no new active AK's    No active AK's no recurrence of previous skin cancers.  Tick bite resolved.  No new suspicious lesions    Multiple nevi over the lower extremities, appears stable more lentiginous junctional nevi benign patterns on dermoscopy     Remarkable for: Waxy keratotic papules anterior scalp.  Reassurance regarding benign seborrheic keratoses.  No treatment necessary.  Smaller seborrheic keratoses scattered over the vertex.  Waxy tan keratotic papules lesions in areas of concern as noted reassurance given.  Benign nature discussed.  Possibility of cryo, alphahydroxy acids over-the-counter retinol's discussed.    History of actinic keratoses no active lesions currently extensive lentigines sun damage continue careful sun protection appears resolved precancerous nature discussed. continue careful sun protection     Nevus left thigh  observe.  Stable light brown    No other suspicious lesions multiple nevi.    patient with history of skin cancer.  No evidence of recurrence.    No new skin cancer.  No recurrence BCC    Family history melanoma in son continue regular follow-up.  Numerous benign seborrheic keratoses  Waxy tan keratotic papules lesions in areas of concern as noted reassurance given.  Benign nature discussed.  Possibility of cryo, alphahydroxy acids over-the-counter retinol's discussed.    Extensive nevi no significant changes see both body maps old and new.  Please refer to map for specific lesions.  See additional diagnoses.  Pros cons of various therapies, risks benefits discussed.Pathophysiology discussed with patient.  Therapeutic options reviewed.  See  Medications in grid.  Instructions reviewed at length.    Benign nevi, seborrheic  keratoses, cherry angiomas:  Reassurance regarding other benign skin lesions.    General skin care questions answered.   Reassurance regarding benign skin lesions.    Monitor for new or changing lesions. Signs and symptoms of skin cancer, ABCDE's of melanoma ( additional information available at AAD.org, skincancer.org) Encourage Sunscreen (broad-spectrum, ideally mineral-based-UVA/UVB -SPF 30 or higher) use encouraged, sun protection/sun protective clothing, self exams reviewed Followup as noted RTC ---routine checkup 6 mos -one year or p.r.n.    Encounter Times   Including precharting, reviewing chart, prior notes obtaining history: 10 minutes, medical exam :10 minutes, notes on body map, plan, counseling 10minutes My total time spent caring for the patient on the day of the encounter: 30 minutes     The patient indicates understanding of these issues and agrees to the plan.  The patient is asked to return as noted in follow-up/ above.    This note was generated using Dragon voice recognition software.  Please contact me regarding any confusion resulting from errors in recognition..  Note to  patient and family: The 21st Century Cures Act makes medical notes like these available to patients. However, be advised this is a medical document. It is intended as iqlf-pl-ular communication and monitoring of a patient's care needs. It is written in medical language and may contain abbreviations or verbiage that are unfamiliar. It may appear blunt or direct. Medical documents are intended to carry relevant information, facts as evident and the clinical opinion of the practitioner.

## 2025-07-10 ENCOUNTER — PATIENT MESSAGE (OUTPATIENT)
Dept: NEUROLOGY | Facility: CLINIC | Age: 85
End: 2025-07-10

## 2025-07-11 ENCOUNTER — HOSPITAL ENCOUNTER (OUTPATIENT)
Dept: ULTRASOUND IMAGING | Facility: HOSPITAL | Age: 85
Discharge: HOME | End: 2025-07-11
Attending: OBSTETRICS & GYNECOLOGY
Payer: MEDICARE

## 2025-07-11 DIAGNOSIS — N83.201 RIGHT OVARIAN CYST: ICD-10-CM

## 2025-07-11 PROCEDURE — 76856 US EXAM PELVIC COMPLETE: CPT | Performed by: OBSTETRICS & GYNECOLOGY

## 2025-07-21 ENCOUNTER — PATIENT MESSAGE (OUTPATIENT)
Dept: OBGYN CLINIC | Facility: CLINIC | Age: 85
End: 2025-07-21

## 2025-07-21 NOTE — TELEPHONE ENCOUNTER
Indra Kearns, DO  7/20/2025  9:12 PM CDT       Stable right ovarian cyst.  Plan to repeat US in 1 year for surveillance unless any symptoms arise sooner

## 2025-07-23 ENCOUNTER — LAB ENCOUNTER (OUTPATIENT)
Dept: LAB | Facility: HOSPITAL | Age: 85
End: 2025-07-23
Attending: FAMILY MEDICINE
Payer: MEDICARE

## 2025-07-23 DIAGNOSIS — E72.11 ADENOSYLCOBALAMIN AND METHYLCOBALAMIN SYNTHESIS DEFECT (HCC): ICD-10-CM

## 2025-07-23 DIAGNOSIS — E03.9 MYXEDEMA HEART DISEASE: ICD-10-CM

## 2025-07-23 DIAGNOSIS — E27.9 ADRENAL HYPERTENSION (HCC): ICD-10-CM

## 2025-07-23 DIAGNOSIS — E06.3 CHRONIC LYMPHOCYTIC THYROIDITIS: ICD-10-CM

## 2025-07-23 DIAGNOSIS — K90.9 IDIOPATHIC STEATORRHEA (HCC): ICD-10-CM

## 2025-07-23 DIAGNOSIS — D50.9 IRON DEFICIENCY ANEMIA, UNSPECIFIED: ICD-10-CM

## 2025-07-23 DIAGNOSIS — E83.30 DISORDER OF PHOSPHORUS METABOLISM: ICD-10-CM

## 2025-07-23 DIAGNOSIS — E83.10 DISORDER OF IRON METABOLISM: ICD-10-CM

## 2025-07-23 DIAGNOSIS — I51.9 MYXEDEMA HEART DISEASE: ICD-10-CM

## 2025-07-23 DIAGNOSIS — E61.2 MAGNESIUM DEFICIENCY: ICD-10-CM

## 2025-07-23 DIAGNOSIS — E55.9 AVITAMINOSIS D: ICD-10-CM

## 2025-07-23 DIAGNOSIS — E53.8 BIOTIN-(PROPIONYL-COA-CARBOXYLASE) LIGASE DEFICIENCY: ICD-10-CM

## 2025-07-23 DIAGNOSIS — R53.83 FATIGUE: Primary | ICD-10-CM

## 2025-07-23 DIAGNOSIS — E60 CHRONIC ZINC DEFICIENCY: ICD-10-CM

## 2025-07-23 DIAGNOSIS — D68.59 PRIMARY HYPERCOAGULABLE STATE (HCC): ICD-10-CM

## 2025-07-23 DIAGNOSIS — I15.2 ADRENAL HYPERTENSION (HCC): ICD-10-CM

## 2025-07-23 DIAGNOSIS — E71.120 ADENOSYLCOBALAMIN AND METHYLCOBALAMIN SYNTHESIS DEFECT (HCC): ICD-10-CM

## 2025-07-23 DIAGNOSIS — E78.41 ELEVATED LIPOPROTEIN A LEVEL: ICD-10-CM

## 2025-07-23 LAB
ALBUMIN SERPL-MCNC: 5.2 G/DL (ref 3.2–4.8)
ALBUMIN/GLOB SERPL: 2.4 {RATIO} (ref 1–2)
ALP LIVER SERPL-CCNC: 111 U/L (ref 55–142)
ALT SERPL-CCNC: 24 U/L (ref 10–49)
ANION GAP SERPL CALC-SCNC: 7 MMOL/L (ref 0–18)
AST SERPL-CCNC: 26 U/L (ref ?–34)
BASOPHILS # BLD AUTO: 0.06 X10(3) UL (ref 0–0.2)
BASOPHILS NFR BLD AUTO: 1.1 %
BILIRUB SERPL-MCNC: 0.5 MG/DL (ref 0.2–1.1)
BUN BLD-MCNC: 14 MG/DL (ref 9–23)
BUN/CREAT SERPL: 17.3 (ref 10–20)
CALCIUM BLD-MCNC: 9.4 MG/DL (ref 8.7–10.4)
CHLORIDE SERPL-SCNC: 104 MMOL/L (ref 98–112)
CO2 SERPL-SCNC: 27 MMOL/L (ref 21–32)
CREAT BLD-MCNC: 0.81 MG/DL (ref 0.55–1.02)
D DIMER PPP FEU-MCNC: 1.21 UG/ML FEU (ref ?–0.83)
DEPRECATED HBV CORE AB SER IA-ACNC: 78 NG/ML (ref 50–306)
DEPRECATED RDW RBC AUTO: 43.2 FL (ref 35.1–46.3)
DHEA-S SERPL-MCNC: 67.4 UG/DL (ref 25.9–460.2)
EGFRCR SERPLBLD CKD-EPI 2021: 72 ML/MIN/1.73M2 (ref 60–?)
EOSINOPHIL # BLD AUTO: 0.07 X10(3) UL (ref 0–0.7)
EOSINOPHIL NFR BLD AUTO: 1.3 %
ERYTHROCYTE [DISTWIDTH] IN BLOOD BY AUTOMATED COUNT: 12.6 % (ref 11–15)
FASTING STATUS PATIENT QL REPORTED: YES
FIBRINOGEN PPP-MCNC: 321 MG/DL (ref 200–480)
GLOBULIN PLAS-MCNC: 2.2 G/DL (ref 2–3.5)
GLUCOSE BLD-MCNC: 93 MG/DL (ref 70–99)
HCT VFR BLD AUTO: 39.6 % (ref 35–48)
HCYS SERPL-SCNC: 9.7 UMOL/L (ref 3.7–13.9)
HGB BLD-MCNC: 12.9 G/DL (ref 12–16)
IMM GRANULOCYTES # BLD AUTO: 0.01 X10(3) UL (ref 0–1)
IMM GRANULOCYTES NFR BLD: 0.2 %
IRON SATN MFR SERPL: 25 % (ref 15–50)
IRON SERPL-MCNC: 89 UG/DL (ref 50–170)
LYMPHOCYTES # BLD AUTO: 1.76 X10(3) UL (ref 1–4)
LYMPHOCYTES NFR BLD AUTO: 33.3 %
MAGNESIUM SERPL-MCNC: 2.3 MG/DL (ref 1.6–2.6)
MCH RBC QN AUTO: 30.5 PG (ref 26–34)
MCHC RBC AUTO-ENTMCNC: 32.6 G/DL (ref 31–37)
MCV RBC AUTO: 93.6 FL (ref 80–100)
MONOCYTES # BLD AUTO: 0.4 X10(3) UL (ref 0.1–1)
MONOCYTES NFR BLD AUTO: 7.6 %
NEUTROPHILS # BLD AUTO: 2.98 X10 (3) UL (ref 1.5–7.7)
NEUTROPHILS # BLD AUTO: 2.98 X10(3) UL (ref 1.5–7.7)
NEUTROPHILS NFR BLD AUTO: 56.5 %
OSMOLALITY SERPL CALC.SUM OF ELEC: 286 MOSM/KG (ref 275–295)
PHOSPHATE SERPL-MCNC: 3.5 MG/DL (ref 2.4–5.1)
PLATELET # BLD AUTO: 267 10(3)UL (ref 150–450)
POTASSIUM SERPL-SCNC: 3.9 MMOL/L (ref 3.5–5.1)
PROT SERPL-MCNC: 7.4 G/DL (ref 5.7–8.2)
RBC # BLD AUTO: 4.23 X10(6)UL (ref 3.8–5.3)
SODIUM SERPL-SCNC: 138 MMOL/L (ref 136–145)
T3FREE SERPL-MCNC: 2.88 PG/ML (ref 2.4–4.2)
T4 FREE SERPL-MCNC: 0.9 NG/DL (ref 0.8–1.7)
THYROPEROXIDASE AB SERPL-ACNC: 47 U/ML (ref ?–60)
TOTAL IRON BINDING CAPACITY: 363 UG/DL (ref 250–425)
TRANSFERRIN SERPL-MCNC: 294 MG/DL (ref 250–380)
TSI SER-ACNC: 2.42 UIU/ML (ref 0.55–4.78)
VIT B12 SERPL-MCNC: >2000 PG/ML (ref 211–911)
VIT D+METAB SERPL-MCNC: 74.8 NG/ML (ref 30–100)
WBC # BLD AUTO: 5.3 X10(3) UL (ref 4–11)

## 2025-07-23 PROCEDURE — 82306 VITAMIN D 25 HYDROXY: CPT

## 2025-07-23 PROCEDURE — 86376 MICROSOMAL ANTIBODY EACH: CPT

## 2025-07-23 PROCEDURE — 84482 T3 REVERSE: CPT

## 2025-07-23 PROCEDURE — 84439 ASSAY OF FREE THYROXINE: CPT

## 2025-07-23 PROCEDURE — 85025 COMPLETE CBC W/AUTO DIFF WBC: CPT

## 2025-07-23 PROCEDURE — 85384 FIBRINOGEN ACTIVITY: CPT

## 2025-07-23 PROCEDURE — 82607 VITAMIN B-12: CPT

## 2025-07-23 PROCEDURE — 84630 ASSAY OF ZINC: CPT

## 2025-07-23 PROCEDURE — 86147 CARDIOLIPIN ANTIBODY EA IG: CPT

## 2025-07-23 PROCEDURE — 80053 COMPREHEN METABOLIC PANEL: CPT

## 2025-07-23 PROCEDURE — 83540 ASSAY OF IRON: CPT

## 2025-07-23 PROCEDURE — 82728 ASSAY OF FERRITIN: CPT

## 2025-07-23 PROCEDURE — 85379 FIBRIN DEGRADATION QUANT: CPT

## 2025-07-23 PROCEDURE — 85300 ANTITHROMBIN III ACTIVITY: CPT

## 2025-07-23 PROCEDURE — 84443 ASSAY THYROID STIM HORMONE: CPT

## 2025-07-23 PROCEDURE — 84481 FREE ASSAY (FT-3): CPT

## 2025-07-23 PROCEDURE — 82627 DEHYDROEPIANDROSTERONE: CPT

## 2025-07-23 PROCEDURE — 83695 ASSAY OF LIPOPROTEIN(A): CPT

## 2025-07-23 PROCEDURE — 85303 CLOT INHIBIT PROT C ACTIVITY: CPT

## 2025-07-23 PROCEDURE — 83090 ASSAY OF HOMOCYSTEINE: CPT

## 2025-07-23 PROCEDURE — 84100 ASSAY OF PHOSPHORUS: CPT

## 2025-07-23 PROCEDURE — 85410 FIBRINOLYTIC ANTIPLASMIN: CPT

## 2025-07-23 PROCEDURE — 83735 ASSAY OF MAGNESIUM: CPT

## 2025-07-23 PROCEDURE — 85305 CLOT INHIBIT PROT S TOTAL: CPT

## 2025-07-23 PROCEDURE — 36415 COLL VENOUS BLD VENIPUNCTURE: CPT

## 2025-07-23 PROCEDURE — 84466 ASSAY OF TRANSFERRIN: CPT

## 2025-07-24 LAB
CARDIOLIPIN IGG SERPL-ACNC: 1.9 GPL (ref ?–10)
CARDIOLIPIN IGM SERPL-ACNC: 2.2 MPL (ref ?–10)
LIPOPROTEIN (A): 21.6 NMOL/L
PHOSPHOLIP AB, IGA, INTERP: NEGATIVE
PHOSPHOLIP AB, IGA, INTERP: NEGATIVE
PHOSPHOLIP AB, IGA: 17 [APL'U]/ML
PHOSPHOLIP AB, IGA: 17 [APL'U]/ML
PROTEIN C FUNCTIONAL: 122 %
PROTEIN S FREE: 110 %
PROTEIN S TOTAL: 84 %

## 2025-07-25 LAB
ANTITHROMBIN: 111 %
ZINC: 78 UG/DL

## 2025-07-27 LAB
Lab: 137 %
Lab: 137 %
REVERSE T3: 14.9 NG/DL

## 2025-08-04 ENCOUNTER — OFFICE VISIT (OUTPATIENT)
Dept: DERMATOLOGY CLINIC | Facility: CLINIC | Age: 85
End: 2025-08-04

## 2025-08-04 DIAGNOSIS — L30.9 DERMATITIS: ICD-10-CM

## 2025-08-04 DIAGNOSIS — Z13.89 ENCOUNTER FOR SURVEILLANCE OF ABNORMAL NEVI: ICD-10-CM

## 2025-08-04 DIAGNOSIS — L57.0 AK (ACTINIC KERATOSIS): ICD-10-CM

## 2025-08-04 DIAGNOSIS — Z80.8 FAMILY HISTORY OF MELANOMA: ICD-10-CM

## 2025-08-04 DIAGNOSIS — D22.9 MULTIPLE NEVI: ICD-10-CM

## 2025-08-04 DIAGNOSIS — L82.1 SK (SEBORRHEIC KERATOSIS): Primary | ICD-10-CM

## 2025-08-04 DIAGNOSIS — L81.4 LENTIGO: ICD-10-CM

## 2025-08-04 DIAGNOSIS — D23.9 BENIGN NEOPLASM OF SKIN, UNSPECIFIED LOCATION: ICD-10-CM

## 2025-08-04 PROCEDURE — 99213 OFFICE O/P EST LOW 20 MIN: CPT | Performed by: DERMATOLOGY

## 2025-08-04 PROCEDURE — 1160F RVW MEDS BY RX/DR IN RCRD: CPT | Performed by: DERMATOLOGY

## 2025-08-04 PROCEDURE — 1159F MED LIST DOCD IN RCRD: CPT | Performed by: DERMATOLOGY

## 2025-08-04 PROCEDURE — G2211 COMPLEX E/M VISIT ADD ON: HCPCS | Performed by: DERMATOLOGY

## 2025-08-04 RX ORDER — IVERMECTIN 3 MG/1
TABLET ORAL
COMMUNITY
Start: 2025-07-23

## (undated) DEVICE — YANKAUER,BULB TIP,W/O VENT,RIGID,STERILE: Brand: MEDLINE

## (undated) DEVICE — CO2 CANNULA,SSOFT,ADLT,7O2,4CO2,FEMALE: Brand: MEDLINE

## (undated) DEVICE — MEDI-VAC NON-CONDUCTIVE SUCTION TUBING 6MM X 1.8M (6FT.) L: Brand: CARDINAL HEALTH

## (undated) DEVICE — V2 SPECIMEN COLLECTION MANIFOLD KIT: Brand: NEPTUNE

## (undated) DEVICE — KIT CLEAN ENDOKIT 1.1OZ GOWNX2

## (undated) DEVICE — CONMED SCOPE SAVER BITE BLOCK, 20X27 MM: Brand: SCOPE SAVER

## (undated) DEVICE — MEDI-VAC NON-CONDUCTIVE SUCTION TUBING: Brand: CARDINAL HEALTH

## (undated) DEVICE — SYRINGE, LUER SLIP, STERILE, 60ML: Brand: MEDLINE

## (undated) NOTE — LETTER
[facility]  Administration of Anesthesia  IAlysia agree to be cared for by a physician anesthesiologist alone and/or with a nurse anesthetist, who is specially trained to monitor me and give me medicine to put me to sleep or keep me comfortable during my procedure    I understand that my anesthesiologist and/or anesthetist is not an employee or agent of [facility] or [employer]. He or she works for [facility]    As the patient asking for anesthesia services, I agree to:  Allow the anesthesiologist (anesthesia doctor) to give me medicine and do additional procedures as necessary. Some examples are: Starting or using an “IV” to give me medicine, fluids or blood during my procedure, and having a breathing tube placed to help me breathe when I’m asleep (intubation). In the event that my heart stops working properly, I understand that my anesthesiologist will make every effort to sustain my life, unless otherwise directed by [facility] Do Not Resuscitate documents.  Tell my anesthesia doctor before my procedure:  If I am pregnant.  The last time that I ate or drank.  iii. All of the medicines I take (including prescriptions, herbal supplements, and pills I can buy without a prescription (including street drugs/illegal medications). Failure to inform my anesthesiologist about these medicines may increase my risk of anesthetic complications.  iv.If I am allergic to anything or have had a reaction to anesthesia before.  I understand how the anesthesia medicine will help me (benefits).  I understand that with any type of anesthesia medicine there are risks:  The most common risks are: nausea, vomiting, sore throat, muscle soreness, damage to my eyes, mouth, or teeth (from breathing tube placement).  Rare risks include: remembering what happened during my procedure, allergic reactions to medications, injury to my airway, heart, lungs, vision, nerves, or  muscles and in extremely rare instances death.  My doctor has explained to me other choices available to me for my care (alternatives).  Pregnant Patients (“epidural”):  I understand that the risks of having an epidural (medicine given into my back to help control pain during labor), include itching, low blood pressure, difficulty urinating, headache or slowing of the baby’s heart. Very rare risks include infection, bleeding, seizure, irregular heart rhythms and nerve injury.  Regional Anesthesia (“spinal”, “epidural”, & “nerve blocks”):  I understand that rare but potential complications include headache, bleeding, infection, seizure, irregular heart rhythms, and nerve injury.    _____________________________________________________________________________  Patient (or Representative) Signature/Relationship to Patient  Date   Time    _____________________________________________________________________________   Name (if used)    Language/Organization   Time    _____________________________________________________________________________  Nurse Anesthetist Signature     Date   Time  _____________________________________________________________________________  Anesthesiologist Signature     Date   Time  I have discussed the procedure and information above with the patient (or patient’s representative) and answered their questions. The patient or their representative has agreed to have anesthesia services.    _____________________________________________________________________________  Witness        Date   Time  I have verified that the signature is that of the patient or patient’s representative, and that it was signed before the procedure  Patient Name: Alysia Downing     : 3/20/1940                 Printed: 10/29/2024 at 8:41 AM    Medical Record #: [id number]                                            Page 1 of 1  ----------ANESTHESIA CONSENT----------

## (undated) NOTE — MR AVS SNAPSHOT
[facility] - [place] [address] [place]  [id number]  [phone]               Thank you for choosing us for your health care visit with Hari Ferguson MD.  We are glad to serve you and happy to provide you with this summary of information, go to [email or web address] and click on the Sign Up Now link in the [employer] box. Enter your [employer] Activation Code exactly as it appears below along with your Zip Code and Date of Birth to complete the sign-up process.  If you do